# Patient Record
Sex: MALE | Race: WHITE | Employment: FULL TIME | URBAN - METROPOLITAN AREA
[De-identification: names, ages, dates, MRNs, and addresses within clinical notes are randomized per-mention and may not be internally consistent; named-entity substitution may affect disease eponyms.]

---

## 2017-05-23 RX ORDER — AMITRIPTYLINE HYDROCHLORIDE 10 MG/1
10 TABLET, FILM COATED ORAL
Qty: 30 TAB | Refills: 1 | Status: SHIPPED | OUTPATIENT
Start: 2017-05-23 | End: 2017-06-08 | Stop reason: SDUPTHER

## 2017-05-23 RX ORDER — BUPROPION HYDROCHLORIDE 300 MG/1
300 TABLET ORAL
Qty: 30 TAB | Refills: 1 | Status: SHIPPED | OUTPATIENT
Start: 2017-05-23 | End: 2017-06-29 | Stop reason: SDUPTHER

## 2017-05-23 NOTE — TELEPHONE ENCOUNTER
From: Antwan Pino  To:  Debbie Sepulveda MD  Sent: 5/23/2017 10:34 AM EDT  Subject: Medication Renewal Request    Original authorizing provider: MD Antwan Lee would like a refill of the following medications:  buPROPion XL (WELLBUTRIN XL) 300 mg XL tablet Debbie Sepulveda MD]  amitriptyline (ELAVIL) 10 mg tablet Debbie Sepulveda MD]    Preferred pharmacy: [facility]/PHARMACY #[id number] - [address], [address] [facility],Third Floor:

## 2017-05-26 RX ORDER — AMITRIPTYLINE HYDROCHLORIDE 10 MG/1
TABLET, FILM COATED ORAL
Qty: 30 TAB | Refills: 4 | Status: SHIPPED | OUTPATIENT
Start: 2017-05-26 | End: 2017-06-08 | Stop reason: SDUPTHER

## 2017-06-08 ENCOUNTER — OFFICE VISIT (OUTPATIENT)
Dept: INTERNAL MEDICINE CLINIC | Age: 37
End: 2017-06-08

## 2017-06-08 VITALS
OXYGEN SATURATION: 98 % | TEMPERATURE: 97.8 F | HEIGHT: 73 IN | RESPIRATION RATE: 18 BRPM | BODY MASS INDEX: 25.25 KG/M2 | SYSTOLIC BLOOD PRESSURE: 129 MMHG | HEART RATE: 65 BPM | WEIGHT: 190.5 LBS | DIASTOLIC BLOOD PRESSURE: 85 MMHG

## 2017-06-08 DIAGNOSIS — F41.9 ANXIETY: ICD-10-CM

## 2017-06-08 DIAGNOSIS — F43.0 STRESS REACTION: Primary | ICD-10-CM

## 2017-06-08 RX ORDER — CLONAZEPAM 0.5 MG/1
0.5 TABLET ORAL
Qty: 30 TAB | Refills: 1 | Status: SHIPPED | OUTPATIENT
Start: 2017-06-08 | End: 2018-12-04 | Stop reason: SDUPTHER

## 2017-06-08 NOTE — PROGRESS NOTES
HISTORY OF PRESENT ILLNESS  Remedios Flores is a 31 y.o. male. HPI  Follow up on anxiety, stress reaction. Increased anxiety - getting  in July, grandmother sick, work stressful. Using lorazepam more frequently up to 6 times/ week. Works well in certain situations. No depression. IBS-D symptoms much improved on Viberzi. Seeing DR. Asuncion Casas for this. Patient Active Problem List   Diagnosis Code    AR (allergic rhinitis) J30.9    Passage of loose stools R19.7    Anxiety F41.9    IBS (irritable bowel syndrome) K58.9     Past Medical History:   Diagnosis Date    Sinus infection     patient first      Allergies   Allergen Reactions    Arthrotec 50 [Diclofenac-Misoprostol] Other (comments)     GI upset     Diclofenac Diarrhea     GI upset.  Indomethacin Other (comments)     Gi symptoms     Current Outpatient Prescriptions on File Prior to Visit   Medication Sig Dispense Refill    buPROPion XL (WELLBUTRIN XL) 300 mg XL tablet Take 1 Tab by mouth every morning. Appointment required before additional refills approved. 30 Tab 1    VIBERZI 100 mg tablet Take 100 mg by mouth two (2) times daily (with meals).  amitriptyline (ELAVIL) 10 mg tablet TAKE 1 TABLET BY MOUTH AT BEDTIME 30 Tab 5    famotidine (PEPCID AC) 10 mg tablet Take 10 mg by mouth as needed.  fluticasone (FLONASE) 50 mcg/actuation nasal spray USE 1 SPRAY EACH NOSTRIL EVERY DAY (Patient taking differently: USE 1 SPRAY EACH NOSTRIL EVERY DAY AS NEEDED) 1 Bottle 11    loratadine (CLARITIN) 10 mg tablet Take 10 mg by mouth as needed.  ACETAMINOPHEN (TYLENOL PO) Take 1,000 mg by mouth as needed.  loperamide (IMMODIUM) 2 mg tablet Take 1 Tab by mouth four (4) times daily as needed for Diarrhea.  0     No current facility-administered medications on file prior to visit.       Social History   Substance Use Topics    Smoking status: Never Smoker    Smokeless tobacco: Never Used    Alcohol use 1.0 oz/week     1 Cans of beer, 1 Shots of liquor per week      Comment: very rare          ROS    Physical Exam   Psychiatric: He has a normal mood and affect. His speech is normal and behavior is normal. Judgment and thought content normal. Cognition and memory are normal.     Visit Vitals    /85 (BP 1 Location: Left arm, BP Patient Position: Sitting)    Pulse 65    Temp 97.8 °F (36.6 °C) (Oral)    Resp 18    Ht 6' 1\" (1.854 m)    Wt 190 lb 8 oz (86.4 kg)    SpO2 98%    BMI 25.13 kg/m2       ASSESSMENT and PLAN  Efrain Lopez was seen today for anxiety. Diagnoses and all orders for this visit:    Stress reaction  -     clonazePAM (KLONOPIN) 0.5 mg tablet; Take 1 Tab by mouth two (2) times daily as needed. Max Daily Amount: 1 mg. Anxiety  -     clonazePAM (KLONOPIN) 0.5 mg tablet; Take 1 Tab by mouth two (2) times daily as needed. Max Daily Amount: 1 mg. He's having to take more frequent doses of lorazepam.  Change to clonazepam for longer duration of treatment. Call if not helping. Continue wellbutrin. He feels stress will be relieved after his wedding. Over 50% of the 15 minutes face to face with Isamar Donald consisted of counseling and/or discussing treatment plans in reference to his anxiety. Follow-up Disposition:  Return in about 6 months (around 12/8/2017).

## 2017-06-08 NOTE — MR AVS SNAPSHOT
Visit Information Date & Time Provider Department Dept. Phone Encounter #  
 6/8/2017  1:15 PM Sergio Abreu MD [facility] [address] [id number] Follow-up Instructions Return in about 6 months (around 12/8/2017). Upcoming Health Maintenance Date Due INFLUENZA AGE 9 TO ADULT 8/1/2017 DTaP/Tdap/Td series (2 - Td) 2/27/2026 COLONOSCOPY 6/7/2026 Allergies as of 6/8/2017  Review Complete On: 12/13/2016 By: Sergio Abreu MD  
  
 Severity Noted Reaction Type Reactions Arthrotec 50 [Diclofenac-misoprostol]  04/02/2012   Side Effect Other (comments) GI upset Diclofenac  03/09/2012    Diarrhea GI upset. Indomethacin  09/23/2014    Other (comments) Gi symptoms Current Immunizations  Reviewed on 9/23/2014 Name Date Tdap 2/27/2016 Not reviewed this visit You Were Diagnosed With   
  
 Codes Comments Stress reaction    -  Primary ICD-10-CM: F43.0 ICD-9-CM: 308.9 Anxiety     ICD-10-CM: F41.9 ICD-9-CM: 300.00 Vitals BP Pulse Temp Resp Height(growth percentile) Weight(growth percentile) 129/85 (BP 1 Location: Left arm, BP Patient Position: Sitting) 65 97.8 °F (36.6 °C) (Oral) 18 6' 1\" (1.854 m) 190 lb 8 oz (86.4 kg) SpO2 BMI Smoking Status 98% 25.13 kg/m2 Never Smoker BMI and BSA Data Body Mass Index Body Surface Area  
 25.13 kg/m 2 2.11 m 2 Preferred Pharmacy Pharmacy Name Phone [id number] [address]  [phone] Your Updated Medication List  
  
   
This list is accurate as of: 6/8/17  1:36 PM.  Always use your most recent med list.  
  
  
  
  
 amitriptyline 10 mg tablet Commonly known as:  ELAVIL TAKE 1 TABLET BY MOUTH AT BEDTIME  
  
 buPROPion  mg XL tablet Commonly known as:  Glenora Arlette Take 1 Tab by mouth every morning. Appointment required before additional refills approved. CLARITIN 10 mg tablet Generic drug:  loratadine Take 10 mg by mouth as needed. clonazePAM 0.5 mg tablet Commonly known as:  [place] Alonsoam Take 1 Tab by mouth two (2) times daily as needed. Max Daily Amount: 1 mg. fluticasone 50 mcg/actuation nasal spray Commonly known as:  FLONASE  
USE 1 SPRAY EACH NOSTRIL EVERY DAY  
  
 loperamide 2 mg tablet Commonly known as:  IMMODIUM Take 1 Tab by mouth four (4) times daily as needed for Diarrhea. PEPCID AC 10 mg tablet Generic drug:  famotidine Take 10 mg by mouth as needed. TYLENOL PO Take 1,000 mg by mouth as needed. VIBERZI 100 mg tablet Generic drug:  eluxadoline Take 100 mg by mouth two (2) times daily (with meals). Prescriptions Printed Refills  
 clonazePAM (KLONOPIN) 0.5 mg tablet 1 Sig: Take 1 Tab by mouth two (2) times daily as needed. Max Daily Amount: 1 mg. Class: Print Route: Oral  
  
Follow-up Instructions Return in about 6 months (around 12/8/2017). Introducing [facility] & HEALTH SERVICES! Dear Madison Holder: Thank you for requesting a [employer]way account. Our records indicate that you already have an active Subway account. You can access your account anytime at [email or web address] "[email or web address]/[email or web address] Did you know that you can access your hospital and ER discharge instructions at any time in Subway? You can also review all of your test results from your hospital stay or ER visit. Additional Information If you have questions, please visit the Frequently Asked Questions section of the Subway website at [email or web address] "[email or web address][email or web address]/. Remember, Subway is NOT to be used for urgent needs. For medical emergencies, dial 911. Now available from your iPhone and Android! Please provide this summary of care documentation to your next provider. Your primary care clinician is listed as Beck Chang.  If you have any questions after today's visit, please call [phone].

## 2017-06-29 RX ORDER — BUPROPION HYDROCHLORIDE 300 MG/1
300 TABLET ORAL
Qty: 90 TAB | Refills: 0 | Status: SHIPPED | OUTPATIENT
Start: 2017-06-29 | End: 2017-06-29 | Stop reason: SDUPTHER

## 2017-06-29 NOTE — TELEPHONE ENCOUNTER
From: Perla Rojas  To: Ruthie Beavers MD  Sent: 6/29/2017 11:25 AM EDT  Subject: Medication Renewal Request    Original authorizing provider: MD Perla Reid would like a refill of the following medications:  buPROPion XL (WELLBUTRIN XL) 300 mg XL tablet Ruthie Beavers MD]    Preferred pharmacy: [facility]/PHARMACY #[id number] - [address]:  I attempted to have a 90-day supply and there are not enough refills per the pharmacy.

## 2017-11-01 ENCOUNTER — OFFICE VISIT (OUTPATIENT)
Dept: INTERNAL MEDICINE CLINIC | Age: 37
End: 2017-11-01

## 2017-11-01 VITALS
SYSTOLIC BLOOD PRESSURE: 118 MMHG | OXYGEN SATURATION: 98 % | HEIGHT: 73 IN | HEART RATE: 83 BPM | RESPIRATION RATE: 18 BRPM | DIASTOLIC BLOOD PRESSURE: 83 MMHG | TEMPERATURE: 98.8 F | WEIGHT: 195 LBS | BODY MASS INDEX: 25.84 KG/M2

## 2017-11-01 DIAGNOSIS — J01.40 ACUTE NON-RECURRENT PANSINUSITIS: Primary | ICD-10-CM

## 2017-11-01 RX ORDER — CEFDINIR 300 MG/1
300 CAPSULE ORAL 2 TIMES DAILY
Qty: 20 CAP | Refills: 0 | Status: SHIPPED | OUTPATIENT
Start: 2017-11-01 | End: 2018-12-04 | Stop reason: ALTCHOICE

## 2017-11-01 NOTE — PROGRESS NOTES
HISTORY OF PRESENT ILLNESS  Swathi Leone is a 30 y.o. male. HPI  Upper respiratory illness:  Swathi Leone presents with complaints of congestion, sore throat, post nasal drip, fever, chills, upper sinus pain, unknown color nasal discharge and fatigue for 3-4 weeks. no nausea and no vomiting . he has not had  night sweats, myalgias, fever and chills. Symptoms are moderate. Over-the-counter remedies including mucinex, flonase   has been used with poor relief of symptoms. Worse over last week. Drinking plenty of fluids: yes  Asthma?:  no  non-smoker  Contacts with similar infections: yes    Patient Active Problem List   Diagnosis Code    AR (allergic rhinitis) J30.9    Passage of loose stools R19.7    Anxiety F41.9    IBS (irritable bowel syndrome) K58.9     Past Medical History:   Diagnosis Date    Sinus infection     patient first      Allergies   Allergen Reactions    Arthrotec 50 [Diclofenac-Misoprostol] Other (comments)     GI upset     Diclofenac Diarrhea     GI upset.  Indomethacin Other (comments)     Gi symptoms     Current Outpatient Prescriptions on File Prior to Visit   Medication Sig Dispense Refill    buPROPion XL (WELLBUTRIN XL) 300 mg XL tablet TAKE 1 TAB BY MOUTH EVERY MORNING. 90 Tab 1    clonazePAM (KLONOPIN) 0.5 mg tablet Take 1 Tab by mouth two (2) times daily as needed. Max Daily Amount: 1 mg. 30 Tab 1    VIBERZI 100 mg tablet Take 100 mg by mouth two (2) times daily (with meals).  amitriptyline (ELAVIL) 10 mg tablet TAKE 1 TABLET BY MOUTH AT BEDTIME 30 Tab 5    famotidine (PEPCID AC) 10 mg tablet Take 10 mg by mouth as needed.  fluticasone (FLONASE) 50 mcg/actuation nasal spray USE 1 SPRAY EACH NOSTRIL EVERY DAY (Patient taking differently: USE 1 SPRAY EACH NOSTRIL EVERY DAY AS NEEDED) 1 Bottle 11    loratadine (CLARITIN) 10 mg tablet Take 10 mg by mouth as needed.  ACETAMINOPHEN (TYLENOL PO) Take 1,000 mg by mouth as needed.       loperamide (IMMODIUM) 2 mg tablet Take 1 Tab by mouth four (4) times daily as needed for Diarrhea.  0     No current facility-administered medications on file prior to visit. Social History   Substance Use Topics    Smoking status: Never Smoker    Smokeless tobacco: Never Used    Alcohol use 1.0 oz/week     1 Cans of beer, 1 Shots of liquor per week      Comment: very rare                  ROS    Physical Exam   Constitutional: He appears well-developed and well-nourished. No distress. /83 (BP 1 Location: Left arm, BP Patient Position: Sitting)  Pulse 83  Temp 98.8 °F (37.1 °C) (Oral)   Resp 18  Ht 6' 1\" (1.854 m)  Wt 195 lb (88.5 kg)  SpO2 98%  BMI 25.73 kg/m2   HENT:   Head: Normocephalic and atraumatic. Right Ear: Tympanic membrane and ear canal normal. No decreased hearing is noted. Left Ear: Tympanic membrane and ear canal normal. No decreased hearing is noted. Nose: No mucosal edema or rhinorrhea. No epistaxis. Right sinus exhibits no maxillary sinus tenderness and no frontal sinus tenderness. Left sinus exhibits no maxillary sinus tenderness and no frontal sinus tenderness. Mouth/Throat: Uvula is midline and mucous membranes are normal. No oral lesions. Normal dentition. Posterior oropharyngeal erythema present. No oropharyngeal exudate, posterior oropharyngeal edema or tonsillar abscesses. Eyes: Conjunctivae are normal. Pupils are equal, round, and reactive to light. Right eye exhibits no discharge. Left eye exhibits no discharge. No scleral icterus. Neck: Neck supple. Cardiovascular: Normal rate, regular rhythm and normal heart sounds. Pulmonary/Chest: Effort normal and breath sounds normal.   Lymphadenopathy:     He has no cervical adenopathy. Neurological: He is alert. Skin: Skin is warm and dry. He is not diaphoretic. Nursing note and vitals reviewed. ASSESSMENT and PLAN  Diagnoses and all orders for this visit:    1.  Acute non-recurrent pansinusitis  Julianne Cranker was diagnosed with sinusitis. he is advised to drink plenty of water, use shower steam or humidifier to loosen secretions, saline nasal lavage 3 times daily and get plenty of rest.  he may use mucinex 1200mg twice daily, afrin nasal spray (2 sprays each nostril twice daily for up to 5 days) along with tylenol or advil as needed for fever and pain. Written instructions were given to the patient emphasizing these recommendations. -     cefdinir (OMNICEF) 300 mg capsule; Take 1 Cap by mouth two (2) times a day. Follow-up Disposition:  Return if symptoms worsen or fail to improve.

## 2017-11-01 NOTE — PATIENT INSTRUCTIONS
Sinusitis: Care Instructions  Your Care Instructions    Sinusitis is an infection of the lining of the sinus cavities in your head. Sinusitis often follows a cold. It causes pain and pressure in your head and face. In most cases, sinusitis gets better on its own in 1 to 2 weeks. But some mild symptoms may last for several weeks. Sometimes antibiotics are needed. Follow-up care is a key part of your treatment and safety. Be sure to make and go to all appointments, and call your doctor if you are having problems. It's also a good idea to know your test results and keep a list of the medicines you take. How can you care for yourself at home? · Take an over-the-counter pain medicine, such as acetaminophen (Tylenol), ibuprofen (Advil, Motrin), or naproxen (Aleve). Read and follow all instructions on the label. · If the doctor prescribed antibiotics, take them as directed. Do not stop taking them just because you feel better. You need to take the full course of antibiotics. · Be careful when taking over-the-counter cold or flu medicines and Tylenol at the same time. Many of these medicines have acetaminophen, which is Tylenol. Read the labels to make sure that you are not taking more than the recommended dose. Too much acetaminophen (Tylenol) can be harmful. · Breathe warm, moist air from a steamy shower, a hot bath, or a sink filled with hot water. Avoid cold, dry air. Using a humidifier in your home may help. Follow the directions for cleaning the machine. · Use saline (saltwater) nasal washes to help keep your nasal passages open and wash out mucus and bacteria. You can buy saline nose drops at a grocery store or drugstore. Or you can make your own at home by adding 1 teaspoon of salt and 1 teaspoon of baking soda to 2 cups of distilled water. If you make your own, fill a bulb syringe with the solution, insert the tip into your nostril, and squeeze gently. Jose Garberderick your nose.   · Put a hot, wet towel or a warm gel pack on your face 3 or 4 times a day for 5 to 10 minutes each time. · Try a decongestant nasal spray like oxymetazoline (Afrin). Do not use it for more than 3 days in a row. Using it for more than 3 days can make your congestion worse. When should you call for help? Call your doctor now or seek immediate medical care if:  ? · You have new or worse swelling or redness in your face or around your eyes. ? · You have a new or higher fever. ? Watch closely for changes in your health, and be sure to contact your doctor if:  ? · You have new or worse facial pain. ? · The mucus from your nose becomes thicker (like pus) or has new blood in it. ? · You are not getting better as expected. Where can you learn more? Go to http://vane-lyndon.info/. Enter H344 in the search box to learn more about \"Sinusitis: Care Instructions. \"  Current as of: May 12, 2017  Content Version: 11.4  © 2461-0511 [employer]wise, [employer]. Care instructions adapted under license by [employer] (which disclaims liability or warranty for this information). If you have questions about a medical condition or this instruction, always ask your healthcare professional. [address] any warranty or liability for your use of this information.

## 2017-11-01 NOTE — MR AVS SNAPSHOT
Visit Information Date & Time Provider Department Dept. Phone Encounter #  
 11/1/2017 11:15 AM Sofya Adan MD [facility] [id number] Follow-up Instructions Return if symptoms worsen or fail to improve. Your Appointments 12/7/2017 10:15 AM  
COMPLETE PHYSICAL with Sofya Adan MD  
[facility] [address]) Appt Note: cpe; cpe  
 [address] [facility] [address] [address]  
[phone]  
  
   
 [address] [place] [address] [facility] [address] Upcoming Health Maintenance Date Due INFLUENZA AGE 9 TO ADULT 8/1/2017 DTaP/Tdap/Td series (2 - Td) 2/27/2026 COLONOSCOPY 6/7/2026 Allergies as of 11/1/2017  Review Complete On: 11/1/2017 By: Sofya Adan MD  
  
 Severity Noted Reaction Type Reactions Arthrotec 50 [Diclofenac-misoprostol]  04/02/2012   Side Effect Other (comments) GI upset Diclofenac  03/09/2012    Diarrhea GI upset. Indomethacin  09/23/2014    Other (comments) Gi symptoms Current Immunizations  Reviewed on 9/23/2014 Name Date Tdap 2/27/2016 Not reviewed this visit You Were Diagnosed With   
  
 Codes Comments Acute non-recurrent pansinusitis    -  Primary ICD-10-CM: J01.40 ICD-9-CM: 461.8 Vitals BP Pulse Temp Resp Height(growth percentile) Weight(growth percentile) 118/83 (BP 1 Location: Left arm, BP Patient Position: Sitting) 83 98.8 °F (37.1 °C) (Oral) 18 6' 1\" (1.854 m) 195 lb (88.5 kg) SpO2 BMI Smoking Status 98% 25.73 kg/m2 Never Smoker Vitals History BMI and BSA Data Body Mass Index Body Surface Area 25.73 kg/m 2 2.14 m 2 Preferred Pharmacy Pharmacy Name Phone [id number] [address] , [address] [place] [address]  [phone] Your Updated Medication List  
  
   
This list is accurate as of: 11/1/17 11:52 AM.  Always use your most recent med list.  
  
  
  
  
 amitriptyline 10 mg tablet Commonly known as:  ELAVIL TAKE 1 TABLET BY MOUTH AT BEDTIME  
  
 buPROPion  mg XL tablet Commonly known as:  WELLBUTRIN XL  
TAKE 1 TAB BY MOUTH EVERY MORNING. cefdinir 300 mg capsule Commonly known as:  OMNICEF Take 1 Cap by mouth two (2) times a day. CLARITIN 10 mg tablet Generic drug:  loratadine Take 10 mg by mouth as needed. clonazePAM 0.5 mg tablet Commonly known as:  Ranjeet Aver Take 1 Tab by mouth two (2) times daily as needed. Max Daily Amount: 1 mg. fluticasone 50 mcg/actuation nasal spray Commonly known as:  FLONASE  
USE 1 SPRAY EACH NOSTRIL EVERY DAY  
  
 loperamide 2 mg tablet Commonly known as:  IMMODIUM Take 1 Tab by mouth four (4) times daily as needed for Diarrhea. MUCINEX 1,200 mg Ta12 ER tablet Generic drug:  guaiFENesin Take 1,200 mg by mouth two (2) times a day. PEPCID AC 10 mg tablet Generic drug:  famotidine Take 10 mg by mouth as needed. TYLENOL PO Take 1,000 mg by mouth as needed. VIBERZI 100 mg tablet Generic drug:  eluxadoline Take 100 mg by mouth two (2) times daily (with meals). Prescriptions Sent to Pharmacy Refills  
 cefdinir (OMNICEF) 300 mg capsule 0 Sig: Take 1 Cap by mouth two (2) times a day. Class: Normal  
 Pharmacy: [facility]/pharmacy #[id number] Iqbal #: [phone] Route: Oral  
  
Follow-up Instructions Return if symptoms worsen or fail to improve. Patient Instructions Sinusitis: Care Instructions Your Care Instructions Sinusitis is an infection of the lining of the sinus cavities in your head. Sinusitis often follows a cold. It causes pain and pressure in your head and face. In most cases, sinusitis gets better on its own in 1 to 2 weeks. But some mild symptoms may last for several weeks. Sometimes antibiotics are needed. Follow-up care is a key part of your treatment and safety. Be sure to make and go to all appointments, and call your doctor if you are having problems. It's also a good idea to know your test results and keep a list of the medicines you take. How can you care for yourself at home? · Take an over-the-counter pain medicine, such as acetaminophen (Tylenol), ibuprofen (Advil, Motrin), or naproxen (Aleve). Read and follow all instructions on the label. · If the doctor prescribed antibiotics, take them as directed. Do not stop taking them just because you feel better. You need to take the full course of antibiotics. · Be careful when taking over-the-counter cold or flu medicines and Tylenol at the same time. Many of these medicines have acetaminophen, which is Tylenol. Read the labels to make sure that you are not taking more than the recommended dose. Too much acetaminophen (Tylenol) can be harmful. · Breathe warm, moist air from a steamy shower, a hot bath, or a sink filled with hot water. Avoid cold, dry air. Using a humidifier in your home may help. Follow the directions for cleaning the machine. · Use saline (saltwater) nasal washes to help keep your nasal passages open and wash out mucus and bacteria. You can buy saline nose drops at a grocery store or drugstore. Or you can make your own at home by adding 1 teaspoon of salt and 1 teaspoon of baking soda to 2 cups of distilled water. If you make your own, fill a bulb syringe with the solution, insert the tip into your nostril, and squeeze gently. [place] your nose. · Put a hot, wet towel or a warm gel pack on your face 3 or 4 times a day for 5 to 10 minutes each time. · Try a decongestant nasal spray like oxymetazoline (Afrin). Do not use it for more than 3 days in a row. Using it for more than 3 days can make your congestion worse. When should you call for help? Call your doctor now or seek immediate medical care if: ? · You have new or worse swelling or redness in your face or around your eyes. ? · You have a new or higher fever. ? Watch closely for changes in your health, and be sure to contact your doctor if: 
? · You have new or worse facial pain. ? · The mucus from your nose becomes thicker (like pus) or has new blood in it. ? · You are not getting better as expected. Where can you learn more? Go to http://vane-lyndon.info/. Enter B798 in the search box to learn more about \"Sinusitis: Care Instructions. \" Current as of: May 12, 2017 Content Version: 11.4 © 4328-1606 [employer]ivel. Care instructions adapted under license by [employer] (which disclaims liability or warranty for this information). If you have questions about a medical condition or this instruction, always ask your healthcare professional. [address] any warranty or liability for your use of this information. Introducing [facility] & HEALTH SERVICES! Dear Almyra Castleman: Thank you for requesting a CrossLoop account. Our records indicate that you already have an active CrossLoop account. You can access your account anytime at [email or web address]://[employer]. [employer] Did you know that you can access your hospital and ER discharge instructions at any time in CrossLoop? You can also review all of your test results from your hospital stay or ER visit. Additional Information If you have questions, please visit the Frequently Asked Questions section of the CrossLoop website at https://[employer]. [employer]/. Remember, CrossLoop is NOT to be used for urgent needs. For medical emergencies, dial 911. Now available from your iPhone and Android! Please provide this summary of care documentation to your next provider. Your primary care clinician is listed as Stacy Shelton. If you have any questions after today's visit, please call [phone].

## 2018-02-01 RX ORDER — AMITRIPTYLINE HYDROCHLORIDE 10 MG/1
TABLET, FILM COATED ORAL
Qty: 90 TAB | Refills: 2 | Status: SHIPPED | OUTPATIENT
Start: 2018-02-01 | End: 2018-11-19 | Stop reason: SDUPTHER

## 2018-02-01 NOTE — TELEPHONE ENCOUNTER
Refill request from [facility] on file for amitriptyline HCL 10 mg tab.

## 2018-02-06 ENCOUNTER — PATIENT MESSAGE (OUTPATIENT)
Dept: INTERNAL MEDICINE CLINIC | Age: 38
End: 2018-02-06

## 2018-04-04 RX ORDER — BUPROPION HYDROCHLORIDE 300 MG/1
TABLET ORAL
Qty: 90 TAB | Refills: 0 | COMMUNITY
Start: 2018-04-04 | End: 2019-06-20 | Stop reason: SDUPTHER

## 2018-04-06 RX ORDER — BUPROPION HYDROCHLORIDE 300 MG/1
TABLET ORAL
Qty: 90 TAB | Refills: 1 | Status: SHIPPED | OUTPATIENT
Start: 2018-04-06 | End: 2018-09-21 | Stop reason: SDUPTHER

## 2018-11-19 RX ORDER — AMITRIPTYLINE HYDROCHLORIDE 10 MG/1
TABLET, FILM COATED ORAL
Qty: 90 TAB | Refills: 2 | Status: SHIPPED | OUTPATIENT
Start: 2018-11-19 | End: 2019-06-20 | Stop reason: SDUPTHER

## 2018-12-04 ENCOUNTER — OFFICE VISIT (OUTPATIENT)
Dept: INTERNAL MEDICINE CLINIC | Age: 38
End: 2018-12-04

## 2018-12-04 VITALS
TEMPERATURE: 98.6 F | WEIGHT: 202.8 LBS | DIASTOLIC BLOOD PRESSURE: 87 MMHG | SYSTOLIC BLOOD PRESSURE: 118 MMHG | OXYGEN SATURATION: 96 % | BODY MASS INDEX: 26.88 KG/M2 | HEART RATE: 87 BPM | RESPIRATION RATE: 12 BRPM | HEIGHT: 73 IN

## 2018-12-04 DIAGNOSIS — K58.0 IRRITABLE BOWEL SYNDROME WITH DIARRHEA: ICD-10-CM

## 2018-12-04 DIAGNOSIS — J01.11 ACUTE RECURRENT FRONTAL SINUSITIS: Primary | ICD-10-CM

## 2018-12-04 DIAGNOSIS — J45.21 MILD INTERMITTENT REACTIVE AIRWAY DISEASE WITH ACUTE EXACERBATION: ICD-10-CM

## 2018-12-04 DIAGNOSIS — F43.0 STRESS REACTION: ICD-10-CM

## 2018-12-04 DIAGNOSIS — F41.9 ANXIETY: ICD-10-CM

## 2018-12-04 RX ORDER — ALBUTEROL SULFATE 90 UG/1
2 AEROSOL, METERED RESPIRATORY (INHALATION)
Qty: 1 INHALER | Refills: 0 | Status: SHIPPED | OUTPATIENT
Start: 2018-12-04 | End: 2019-06-20 | Stop reason: ALTCHOICE

## 2018-12-04 RX ORDER — CLONAZEPAM 0.5 MG/1
0.5 TABLET ORAL
Qty: 30 TAB | Refills: 1 | Status: SHIPPED | OUTPATIENT
Start: 2018-12-04 | End: 2019-11-29 | Stop reason: SDUPTHER

## 2018-12-04 RX ORDER — BUPROPION HYDROCHLORIDE 300 MG/1
TABLET ORAL
Qty: 90 TAB | Refills: 1 | Status: SHIPPED | OUTPATIENT
Start: 2018-12-04 | End: 2019-06-20 | Stop reason: SDUPTHER

## 2018-12-04 RX ORDER — CEFDINIR 300 MG/1
300 CAPSULE ORAL 2 TIMES DAILY
Qty: 20 CAP | Refills: 0 | Status: SHIPPED | OUTPATIENT
Start: 2018-12-04 | End: 2019-01-03 | Stop reason: ALTCHOICE

## 2018-12-04 NOTE — PROGRESS NOTES
HISTORY OF PRESENT ILLNESS  Geoffrey Robles is a 41 y.o. male. HPI  Presents with complaints of sinus congestion and pressure for the past 3-4 weeks that has worsened over the past several days. Has been traveling for his job and exposed to varying temperatures which he feels has exacerbated symptoms. Has history of recurrent sinusitis that has been much better since he had sinus surgery several years ago. Having pain and pressure over bilateral eyes and thick postnasal drainage. Has been feeling chilled for the past 2 days. Also complains of harsh cough and some upper chest tightness for the past several days as well; denies history of asthma but reports having some upper chest tightness after episodes of prolonged laughter. Has been taking Mucinex OTC and also using Flonase nasal spray without significant improvement. He will be traveling for his work in the next week and is concerned about worsening symptoms while out of town. Feels like his anxiety has been fairly stable on current dose of Wellbutrin and Elavil. Takes clonazepam on a as needed basis especially when he is traveling. Denies harmful thoughts toward self or others. Has been very busy with his work schedule and travels often for his job. Needs refill of Wellbutrin  mg daily. Last refill of Clonazepam in 9/2017 as per  website. Has been working with Dr. Danilo Smith on control of his IBS and trying to identify specific triggers. Notes that when he is traveling for work he tends to develop a flare afterwards. Currently taking Viberzi which has given some relief. Denies blood in stools, weight loss.     Patient Active Problem List   Diagnosis Code    AR (allergic rhinitis) J30.9    Passage of loose stools R19.5    Anxiety F41.9    IBS (irritable bowel syndrome) K58.9     Past Surgical History:   Procedure Laterality Date    SINUS SURGERY PROC UNLISTED  12/2010     Social History     Socioeconomic History    Marital status: SINGLE     Spouse name: Not on file    Number of children: Not on file    Years of education: Not on file    Highest education level: Not on file   Social Needs    Financial resource strain: Not on file    Food insecurity - worry: Not on file    Food insecurity - inability: Not on file    Transportation needs - medical: Not on file   Not iT needs - non-medical: Not on file   Occupational History    Not on file   Tobacco Use    Smoking status: Never Smoker    Smokeless tobacco: Never Used   Substance and Sexual Activity    Alcohol use: Yes     Alcohol/week: 1.0 oz     Types: 1 Cans of beer, 1 Shots of liquor per week     Comment: very rare     Drug use: No    Sexual activity: Not on file   Other Topics Concern    Not on file   Social History Narrative    Not on file     History reviewed. No pertinent family history. Current Outpatient Medications   Medication Sig    buPROPion XL (WELLBUTRIN XL) 300 mg XL tablet TAKE 1 TABLET EVERY MORNING    cefdinir (OMNICEF) 300 mg capsule Take 1 Cap by mouth two (2) times a day.  clonazePAM (KLONOPIN) 0.5 mg tablet Take 1 Tab by mouth two (2) times daily as needed. Max Daily Amount: 1 mg.  albuterol (PROVENTIL HFA, VENTOLIN HFA, PROAIR HFA) 90 mcg/actuation inhaler Take 2 Puffs by inhalation every four (4) hours as needed for Wheezing.  amitriptyline (ELAVIL) 10 mg tablet TAKE 1 TABLET BY MOUTH AT BEDTIME    buPROPion XL (WELLBUTRIN XL) 300 mg XL tablet TAKE 1 TAB BY MOUTH EVERY MORNING.  guaiFENesin (MUCINEX) 1,200 mg Ta12 ER tablet Take 1,200 mg by mouth two (2) times a day.  VIBERZI 100 mg tablet Take 100 mg by mouth two (2) times daily (with meals).  famotidine (PEPCID AC) 10 mg tablet Take 10 mg by mouth as needed.     fluticasone (FLONASE) 50 mcg/actuation nasal spray USE 1 SPRAY EACH NOSTRIL EVERY DAY (Patient taking differently: USE 1 SPRAY EACH NOSTRIL EVERY DAY AS NEEDED)    loratadine (CLARITIN) 10 mg tablet Take 10 mg by mouth as needed.  ACETAMINOPHEN (TYLENOL PO) Take 1,000 mg by mouth as needed.  loperamide (IMMODIUM) 2 mg tablet Take 1 Tab by mouth four (4) times daily as needed for Diarrhea. No current facility-administered medications for this visit. Allergies   Allergen Reactions    Arthrotec 50 [Diclofenac-Misoprostol] Other (comments)     GI upset     Diclofenac Diarrhea     GI upset.  Indomethacin Other (comments)     Gi symptoms     Immunization History   Administered Date(s) Administered    Tdap 02/27/2016       Review of Systems   Constitutional: Positive for chills and malaise/fatigue. Negative for fever. HENT: Positive for congestion, sinus pain and sore throat. Negative for ear pain. Respiratory: Positive for cough and shortness of breath. Negative for sputum production. Cardiovascular: Negative for chest pain and palpitations. Gastrointestinal: Positive for abdominal pain and diarrhea. Negative for blood in stool, constipation, nausea and vomiting. Genitourinary: Negative for dysuria and frequency. Musculoskeletal: Negative for myalgias. Skin: Negative for rash. Neurological: Positive for headaches. Negative for dizziness and tingling. Psychiatric/Behavioral: The patient is nervous/anxious. /87 (BP 1 Location: Left arm, BP Patient Position: Sitting)   Pulse 87   Temp 98.6 °F (37 °C) (Oral)   Resp 12   Ht 6' 1\" (1.854 m)   Wt 202 lb 12.8 oz (92 kg)   SpO2 96%   BMI 26.76 kg/m²   Physical Exam   Constitutional: He is oriented to person, place, and time. He appears well-developed and well-nourished. HENT:   Head: Normocephalic and atraumatic. Right Ear: External ear normal.   Left Ear: External ear normal.   Nose: Mucosal edema present. Right sinus exhibits frontal sinus tenderness. Left sinus exhibits frontal sinus tenderness. Mouth/Throat: Posterior oropharyngeal erythema present. No posterior oropharyngeal edema.    Neck: Normal range of motion. Neck supple. No thyromegaly present. Cardiovascular: Normal rate, regular rhythm and normal heart sounds. Pulmonary/Chest: Effort normal. He has wheezes. He has no rales. Scattered end-inspiration wheezing   Abdominal: Soft. Bowel sounds are normal. There is no tenderness. Musculoskeletal: Normal range of motion. Lymphadenopathy:     He has no cervical adenopathy. Neurological: He is alert and oriented to person, place, and time. Psychiatric: He has a normal mood and affect. His behavior is normal.   Nursing note and vitals reviewed. ASSESSMENT and PLAN  Diagnoses and all orders for this visit:    1. Acute recurrent frontal sinusitis  -     cefdinir (OMNICEF) 300 mg capsule; Take 1 Cap by mouth two (2) times a day. 2. Mild intermittent reactive airway disease with acute exacerbation -- can use as needed  -     albuterol (PROVENTIL HFA, VENTOLIN HFA, PROAIR HFA) 90 mcg/actuation inhaler; Take 2 Puffs by inhalation every four (4) hours as needed for Wheezing. 3. Stress reaction  -     buPROPion XL (WELLBUTRIN XL) 300 mg XL tablet; TAKE 1 TABLET EVERY MORNING  -     clonazePAM (KLONOPIN) 0.5 mg tablet; Take 1 Tab by mouth two (2) times daily as needed. Max Daily Amount: 1 mg.  profile was accessed online for Drea Colmenares and reviewed by me during this encounter. I did not see evidence of inappropriate or suspicious controlled substance prescription activity. 4. Anxiety -- has been fairly stable; takes Clonazepam on prn basis when traveling.  -     buPROPion XL (WELLBUTRIN XL) 300 mg XL tablet; TAKE 1 TABLET EVERY MORNING  -     clonazePAM (KLONOPIN) 0.5 mg tablet; Take 1 Tab by mouth two (2) times daily as needed. Max Daily Amount: 1 mg.    5. Irritable bowel syndrome with diarrhea -- following with GI specialist.      Follow up if signs and symptoms worsen or change. After hours number given.    lab results and schedule of future lab studies reviewed with patient  reviewed diet, exercise and weight control  reviewed medications and side effects in detail

## 2018-12-04 NOTE — PATIENT INSTRUCTIONS
Sinusitis: Care Instructions  Your Care Instructions    Sinusitis is an infection of the lining of the sinus cavities in your head. Sinusitis often follows a cold. It causes pain and pressure in your head and face. In most cases, sinusitis gets better on its own in 1 to 2 weeks. But some mild symptoms may last for several weeks. Sometimes antibiotics are needed. Follow-up care is a key part of your treatment and safety. Be sure to make and go to all appointments, and call your doctor if you are having problems. It's also a good idea to know your test results and keep a list of the medicines you take. How can you care for yourself at home? · Take an over-the-counter pain medicine, such as acetaminophen (Tylenol), ibuprofen (Advil, Motrin), or naproxen (Aleve). Read and follow all instructions on the label. · If the doctor prescribed antibiotics, take them as directed. Do not stop taking them just because you feel better. You need to take the full course of antibiotics. · Be careful when taking over-the-counter cold or flu medicines and Tylenol at the same time. Many of these medicines have acetaminophen, which is Tylenol. Read the labels to make sure that you are not taking more than the recommended dose. Too much acetaminophen (Tylenol) can be harmful. · Breathe warm, moist air from a steamy shower, a hot bath, or a sink filled with hot water. Avoid cold, dry air. Using a humidifier in your home may help. Follow the directions for cleaning the machine. · Use saline (saltwater) nasal washes to help keep your nasal passages open and wash out mucus and bacteria. You can buy saline nose drops at a grocery store or drugstore. Or you can make your own at home by adding 1 teaspoon of salt and 1 teaspoon of baking soda to 2 cups of distilled water. If you make your own, fill a bulb syringe with the solution, insert the tip into your nostril, and squeeze gently. Alexandre Brakeman your nose.   · Put a hot, wet towel or a warm gel pack on your face 3 or 4 times a day for 5 to 10 minutes each time. · Try a decongestant nasal spray like oxymetazoline (Afrin). Do not use it for more than 3 days in a row. Using it for more than 3 days can make your congestion worse. When should you call for help? Call your doctor now or seek immediate medical care if:    · You have new or worse swelling or redness in your face or around your eyes.     · You have a new or higher fever.    Watch closely for changes in your health, and be sure to contact your doctor if:    · You have new or worse facial pain.     · The mucus from your nose becomes thicker (like pus) or has new blood in it.     · You are not getting better as expected. Where can you learn more? Go to http://vane-lyndon.info/. Enter T198 in the search box to learn more about \"Sinusitis: Care Instructions. \"  Current as of: March 28, 2018  Content Version: 11.8  © 0616-7892 [employer]. Care instructions adapted under license by [email or web address] (which disclaims liability or warranty for this information). If you have questions about a medical condition or this instruction, always ask your healthcare professional. [place] [address] any warranty or liability for your use of this information. Reactive Airway Disease: Care Instructions  Your Care Instructions    Reactive airway disease is a breathing problem that appears as wheezing, a whistling noise in your airways. It may be caused by a viral or bacterial infection, allergies, tobacco smoke, or something else in the environment. When you are around these triggers, your body releases chemicals that make the airways get tight. Reactive airway disease is a lot like asthma. Both can cause wheezing. But asthma is ongoing, while reactive airway disease may occur only now and then. Tests can be done to tell whether you have asthma. You may take the same medicines used to treat asthma. Good home care and follow-up care with your doctor can help you recover. Follow-up care is a key part of your treatment and safety. Be sure to make and go to all appointments, and call your doctor if you are having problems. It's also a good idea to know your test results and keep a list of the medicines you take. How can you care for yourself at home? · Take your medicines exactly as prescribed. Call your doctor if you think you are having a problem with your medicine. · Do not smoke or allow others to smoke around you. If you need help quitting, talk to your doctor about stop-smoking programs and medicines. These can increase your chances of quitting for good. · If you know what caused your wheezing (such as perfume or the odor of household chemicals), try to avoid it in the future. · Wash your hands several times a day, and consider using hand gels or wipes that contain alcohol. This can prevent colds and other infections. When should you call for help? Call 911 anytime you think you may need emergency care. For example, call if:    · You have severe trouble breathing.    Watch closely for changes in your health, and be sure to contact your doctor if:    · You cough up yellow, dark brown, or bloody mucus.     · You have a fever.     · Your wheezing gets worse. Where can you learn more? Go to http://vane-lyndon.info/. Enter K266 in the search box to learn more about \"Reactive Airway Disease: Care Instructions. \"  Current as of: December 6, 2017  Content Version: 11.8  © 5862-6963 [employer]. Care instructions adapted under license by [employer] (which disclaims liability or warranty for this information). If you have questions about a medical condition or this instruction, always ask your healthcare professional. [address] any warranty or liability for your use of this information.

## 2019-01-03 ENCOUNTER — TELEPHONE (OUTPATIENT)
Dept: INTERNAL MEDICINE CLINIC | Age: 39
End: 2019-01-03

## 2019-01-03 ENCOUNTER — OFFICE VISIT (OUTPATIENT)
Dept: INTERNAL MEDICINE CLINIC | Age: 39
End: 2019-01-03

## 2019-01-03 VITALS
DIASTOLIC BLOOD PRESSURE: 83 MMHG | HEIGHT: 73 IN | WEIGHT: 201 LBS | OXYGEN SATURATION: 98 % | TEMPERATURE: 98.5 F | SYSTOLIC BLOOD PRESSURE: 128 MMHG | BODY MASS INDEX: 26.64 KG/M2 | RESPIRATION RATE: 12 BRPM | HEART RATE: 83 BPM

## 2019-01-03 DIAGNOSIS — H69.83 EUSTACHIAN TUBE DYSFUNCTION, BILATERAL: ICD-10-CM

## 2019-01-03 DIAGNOSIS — J01.40 ACUTE PANSINUSITIS, RECURRENCE NOT SPECIFIED: Primary | ICD-10-CM

## 2019-01-03 RX ORDER — BENZONATATE 100 MG/1
100 CAPSULE ORAL
COMMUNITY
End: 2019-11-29

## 2019-01-03 RX ORDER — PREDNISONE 20 MG/1
20 TABLET ORAL
Qty: 10 TAB | Refills: 0 | Status: SHIPPED | OUTPATIENT
Start: 2019-01-03 | End: 2019-06-20 | Stop reason: ALTCHOICE

## 2019-01-03 RX ORDER — CEFDINIR 300 MG/1
300 CAPSULE ORAL 2 TIMES DAILY
Qty: 14 CAP | Refills: 0 | Status: SHIPPED | OUTPATIENT
Start: 2019-01-03 | End: 2019-06-20 | Stop reason: ALTCHOICE

## 2019-01-03 RX ORDER — PSEUDOEPHEDRINE HCL 30 MG
30 TABLET ORAL
Qty: 30 TAB | Refills: 0
Start: 2019-01-03 | End: 2019-06-20 | Stop reason: ALTCHOICE

## 2019-01-03 NOTE — PROGRESS NOTES
HISTORY OF PRESENT ILLNESS  Jemima Wood is a 41 y.o. male. HPI  Upper respiratory illness:  Jemima Wood presents with complaints of congestion, sore throat, post nasal drip, cough described as harsh, headache, generalized sinus pain, bilateral ear pressure and yellow nasal discharge for 2 weeks. He went to [facility] 1 week ago and was diagnosed with sinusitis and treated with 7 day course of Augmentin but he does not feel like symptoms have resolved. Continues with some sinus pressure and ears feel full. Has had some GI upset with Augmentin. no nausea and no vomiting . he has not had  myalgias, fever and chills. Symptoms are moderate. Over-the-counter remedies including Mucinex and Flonase   has been used with poor relief of symptoms. Drinking plenty of fluids: yes  Asthma?:  no  non-smoker  Contacts with similar infections: yes         Review of Systems   Constitutional: Positive for malaise/fatigue. Negative for chills and fever. HENT: Positive for congestion, ear pain, sinus pain and sore throat. Respiratory: Negative for cough. Cardiovascular: Negative for chest pain and palpitations. Gastrointestinal: Positive for diarrhea. Negative for nausea and vomiting. Genitourinary: Negative for dysuria and frequency. Musculoskeletal: Negative for back pain and neck pain. Skin: Negative for rash. Neurological: Positive for headaches. Negative for dizziness. /83 (BP 1 Location: Left arm, BP Patient Position: Sitting)   Pulse 83   Temp 98.5 °F (36.9 °C) (Oral)   Resp 12   Ht 6' 1\" (1.854 m)   Wt 201 lb (91.2 kg)   SpO2 98%   BMI 26.52 kg/m²   Physical Exam   Constitutional: He is oriented to person, place, and time. He appears well-developed and well-nourished. HENT:   Head: Normocephalic and atraumatic. Right Ear: External ear normal. Tympanic membrane is not injected. A middle ear effusion is present.    Left Ear: External ear normal. Tympanic membrane is not injected. A middle ear effusion is present. Nose: Mucosal edema present. Right sinus exhibits maxillary sinus tenderness. Left sinus exhibits maxillary sinus tenderness. Mouth/Throat: Posterior oropharyngeal erythema present. No posterior oropharyngeal edema. Neck: Normal range of motion. Neck supple. No thyromegaly present. Cardiovascular: Normal rate and regular rhythm. Pulmonary/Chest: Effort normal and breath sounds normal. He has no wheezes. Musculoskeletal: Normal range of motion. Lymphadenopathy:     He has no cervical adenopathy. Neurological: He is alert and oriented to person, place, and time. Skin: Skin is warm and dry. Psychiatric: He has a normal mood and affect. His behavior is normal.   Nursing note and vitals reviewed. ASSESSMENT and PLAN  Diagnoses and all orders for this visit:    1. Acute pansinusitis, recurrence not specified  -     cefdinir (OMNICEF) 300 mg capsule; Take 1 Cap by mouth two (2) times a day. -     predniSONE (DELTASONE) 20 mg tablet; Take 20 mg by mouth daily (with breakfast). -     pseudoephedrine (SUDAFED) 30 mg tablet; Take 1 Tab by mouth every four (4) hours as needed for Congestion. 2. Eustachian tube dysfunction, bilateral  -     predniSONE (DELTASONE) 20 mg tablet; Take 20 mg by mouth daily (with breakfast). -     pseudoephedrine (SUDAFED) 30 mg tablet; Take 1 Tab by mouth every four (4) hours as needed for Congestion.       lab results and schedule of future lab studies reviewed with patient  reviewed diet, exercise and weight control  reviewed medications and side effects in detail

## 2019-01-03 NOTE — PATIENT INSTRUCTIONS
Sinusitis: Care Instructions  Your Care Instructions    Sinusitis is an infection of the lining of the sinus cavities in your head. Sinusitis often follows a cold. It causes pain and pressure in your head and face. In most cases, sinusitis gets better on its own in 1 to 2 weeks. But some mild symptoms may last for several weeks. Sometimes antibiotics are needed. Follow-up care is a key part of your treatment and safety. Be sure to make and go to all appointments, and call your doctor if you are having problems. It's also a good idea to know your test results and keep a list of the medicines you take. How can you care for yourself at home? · Take an over-the-counter pain medicine, such as acetaminophen (Tylenol), ibuprofen (Advil, Motrin), or naproxen (Aleve). Read and follow all instructions on the label. · If the doctor prescribed antibiotics, take them as directed. Do not stop taking them just because you feel better. You need to take the full course of antibiotics. · Be careful when taking over-the-counter cold or flu medicines and Tylenol at the same time. Many of these medicines have acetaminophen, which is Tylenol. Read the labels to make sure that you are not taking more than the recommended dose. Too much acetaminophen (Tylenol) can be harmful. · Breathe warm, moist air from a steamy shower, a hot bath, or a sink filled with hot water. Avoid cold, dry air. Using a humidifier in your home may help. Follow the directions for cleaning the machine. · Use saline (saltwater) nasal washes to help keep your nasal passages open and wash out mucus and bacteria. You can buy saline nose drops at a grocery store or drugstore. Or you can make your own at home by adding 1 teaspoon of salt and 1 teaspoon of baking soda to 2 cups of distilled water. If you make your own, fill a bulb syringe with the solution, insert the tip into your nostril, and squeeze gently. Franceen Raysa your nose.   · Put a hot, wet towel or a warm gel pack on your face 3 or 4 times a day for 5 to 10 minutes each time. · Try a decongestant nasal spray like oxymetazoline (Afrin). Do not use it for more than 3 days in a row. Using it for more than 3 days can make your congestion worse. When should you call for help? Call your doctor now or seek immediate medical care if:    · You have new or worse swelling or redness in your face or around your eyes.     · You have a new or higher fever.    Watch closely for changes in your health, and be sure to contact your doctor if:    · You have new or worse facial pain.     · The mucus from your nose becomes thicker (like pus) or has new blood in it.     · You are not getting better as expected. Where can you learn more? Go to http://vane-lyndon.info/. Enter K446 in the search box to learn more about \"Sinusitis: Care Instructions. \"  Current as of: March 28, 2018  Content Version: 11.8  © 0376-4072 [employer]. Care instructions adapted under license by [employer] (which disclaims liability or warranty for this information). If you have questions about a medical condition or this instruction, always ask your healthcare professional. [place] [address] any warranty or liability for your use of this information. Eustachian Tube Problems: Care Instructions  Your Care Instructions    The eustachian (say \"you-STAY-shee-un\") tubes run between the inside of the ears and the throat. They keep air pressure stable in the ears. If your eustachian tubes become blocked, the air pressure in your ears changes. The fluids from a cold can clog eustachian tubes, causing pain in the ears. A quick change in air pressure can cause eustachian tubes to close up. This might happen when an airplane changes altitude or when a  goes up or down underwater. Eustachian tube problems often clear up on their own or after antibiotic treatment.  If your tubes continue to be blocked, you may need surgery. Follow-up care is a key part of your treatment and safety. Be sure to make and go to all appointments, and call your doctor if you are having problems. It's also a good idea to know your test results and keep a list of the medicines you take. How can you care for yourself at home? · To ease ear pain, apply a warm washcloth or a heating pad set on low. There may be some drainage from the ear when the heat melts earwax. Put a cloth between the heat source and your skin. Do not use a heating pad with children. · If your doctor prescribed antibiotics, take them as directed. Do not stop taking them just because you feel better. You need to take the full course of antibiotics. · Your doctor may recommend over-the-counter medicine. Be safe with medicines. Oral or nasal decongestants may relieve ear pain. Avoid decongestants that are combined with antihistamines, which tend to cause more blockage. But if allergies seem to be the problem, your doctor may recommend a combination. Be careful with cough and cold medicines. Don't give them to children younger than 6, because they don't work for children that age and can even be harmful. For children 6 and older, always follow all the instructions carefully. Make sure you know how much medicine to give and how long to use it. And use the dosing device if one is included. When should you call for help? Call your doctor now or seek immediate medical care if:    · You develop sudden, complete hearing loss.     · You have severe pain or feel dizzy.     · You have new or increasing pus or blood draining from your ear.     · You have redness, swelling, or pain around or behind the ear.    Watch closely for changes in your health, and be sure to contact your doctor if:    · You do not get better after 2 weeks.     · You have any new symptoms, such as itching or a feeling of fullness in the ear. Where can you learn more?   Go to http://vane-lyndon.info/. Enter Y822 in the search box to learn more about \"Eustachian Tube Problems: Care Instructions. \"  Current as of: March 28, 2018  Content Version: 11.8  © 1879-5996 [employer]wise, Incorporated. Care instructions adapted under license by [employer] (which disclaims liability or warranty for this information). If you have questions about a medical condition or this instruction, always ask your healthcare professional. [address] any warranty or liability for your use of this information.

## 2019-01-03 NOTE — TELEPHONE ENCOUNTER
----- Message from Dread Pelletier sent at 1/3/2019  1:11 PM EST -----  Regarding: Dr. Hester Chin: [phone]  Pt advises that he was seen at a [address] [facility] last week for sore throat, sinus issues, and fluid in his ears. He was given antibiotics and the issue has not cleared up. Pt would like to come in today or tomorrow if possible.

## 2019-06-18 ENCOUNTER — TELEPHONE (OUTPATIENT)
Dept: INTERNAL MEDICINE CLINIC | Age: 39
End: 2019-06-18

## 2019-06-18 NOTE — TELEPHONE ENCOUNTER
I spoke with patient regarding his acute appt on Thursday. Pt states he never got the letter in the mail regarding his PCP Dr. Álvaro Ovalle. I explained what the letter stated and that he will have to est care with another [facility]/new pcp practice. We will see pt for his acute sx on Thursday and provide pt with a list of other practices to est care. Pt was very thankful for the call.

## 2019-06-20 ENCOUNTER — OFFICE VISIT (OUTPATIENT)
Dept: INTERNAL MEDICINE CLINIC | Age: 39
End: 2019-06-20

## 2019-06-20 VITALS
HEART RATE: 64 BPM | SYSTOLIC BLOOD PRESSURE: 123 MMHG | RESPIRATION RATE: 18 BRPM | TEMPERATURE: 99.5 F | OXYGEN SATURATION: 96 % | DIASTOLIC BLOOD PRESSURE: 83 MMHG | WEIGHT: 196 LBS | BODY MASS INDEX: 25.98 KG/M2 | HEIGHT: 73 IN

## 2019-06-20 DIAGNOSIS — J06.9 UPPER RESPIRATORY TRACT INFECTION, UNSPECIFIED TYPE: Primary | ICD-10-CM

## 2019-06-20 DIAGNOSIS — F41.9 ANXIETY: ICD-10-CM

## 2019-06-20 DIAGNOSIS — F43.0 STRESS REACTION: ICD-10-CM

## 2019-06-20 RX ORDER — AMITRIPTYLINE HYDROCHLORIDE 10 MG/1
10 TABLET, FILM COATED ORAL
Qty: 30 TAB | Refills: 5 | Status: SHIPPED | OUTPATIENT
Start: 2019-06-20 | End: 2019-11-29 | Stop reason: SDUPTHER

## 2019-06-20 RX ORDER — CEFDINIR 300 MG/1
300 CAPSULE ORAL 2 TIMES DAILY
Qty: 14 CAP | Refills: 0 | Status: SHIPPED | OUTPATIENT
Start: 2019-06-20 | End: 2019-11-29

## 2019-06-20 RX ORDER — PSEUDOEPHEDRINE HCL 30 MG
30 TABLET ORAL
Qty: 20 TAB | Refills: 0
Start: 2019-06-20 | End: 2019-11-29

## 2019-06-20 RX ORDER — BUPROPION HYDROCHLORIDE 300 MG/1
TABLET ORAL
Qty: 90 TAB | Refills: 1 | Status: SHIPPED | OUTPATIENT
Start: 2019-06-20 | End: 2019-11-29 | Stop reason: SDUPTHER

## 2019-06-20 NOTE — PATIENT INSTRUCTIONS

## 2019-06-20 NOTE — PROGRESS NOTES
HISTORY OF PRESENT ILLNESS  Jolie Peoples is a 41 y.o. male. HPI  Upper respiratory illness:  Jolie Peoples presents with complaints of congestion, sore throat, post nasal drip, headache, suspected fevers but not measured at home and clear to green nasal discharge for 3 weeks. Initially felt like he had a cold but then symptoms persisted and have worsened since last week when he started feeling feverish and more fatigued. no nausea and no vomiting . he has not had  myalgias. Symptoms are moderate. Over-the-counter remedies including Mucinex, Flonase nasal spray   has been used with poor relief of symptoms. Drinking plenty of fluids: yes  Asthma?:  no  non-smoker  Contacts with similar infections: yes     Reports anxiety has been stable on current dose of Wellbutrin and Elavil. Has been very busy with work but overall feels like he is handling stressors better. Denies harmful thoughts towards self or others. Patient Active Problem List   Diagnosis Code    AR (allergic rhinitis) J30.9    Passage of loose stools R19.5    Anxiety F41.9    IBS (irritable bowel syndrome) K58.9     Past Surgical History:   Procedure Laterality Date    SINUS SURGERY PROC UNLISTED  12/2010     Social History     Socioeconomic History    Marital status: SINGLE     Spouse name: Not on file    Number of children: Not on file    Years of education: Not on file    Highest education level: Not on file   Occupational History    Not on file   Social Needs    Financial resource strain: Not on file    Food insecurity:     Worry: Not on file     Inability: Not on file    Transportation needs:     Medical: Not on file     Non-medical: Not on file   Tobacco Use    Smoking status: Never Smoker    Smokeless tobacco: Never Used   Substance and Sexual Activity    Alcohol use:  Yes     Alcohol/week: 1.0 oz     Types: 1 Cans of beer, 1 Shots of liquor per week     Comment: very rare     Drug use: No    Sexual activity: Not on file   Lifestyle    Physical activity:     Days per week: Not on file     Minutes per session: Not on file    Stress: Not on file   Relationships    Social connections:     Talks on phone: Not on file     Gets together: Not on file     Attends [employer] service: Not on file     Active member of club or organization: Not on file     Attends meetings of clubs or organizations: Not on file     Relationship status: Not on file    Intimate partner violence:     Fear of current or ex partner: Not on file     Emotionally abused: Not on file     Physically abused: Not on file     Forced sexual activity: Not on file   Other Topics Concern    Not on file   Social History Narrative    Not on file     No family history on file. Current Outpatient Medications   Medication Sig    amitriptyline (ELAVIL) 10 mg tablet Take 1 Tab by mouth nightly.  buPROPion XL (WELLBUTRIN XL) 300 mg XL tablet TAKE 1 TABLET EVERY MORNING    cefdinir (OMNICEF) 300 mg capsule Take 1 Cap by mouth two (2) times a day.  pseudoephedrine (SUDAFED) 30 mg tablet Take 1 Tab by mouth every four (4) hours as needed for Congestion.  guaiFENesin (MUCINEX) 1,200 mg Ta12 ER tablet Take 1,200 mg by mouth two (2) times a day.  VIBERZI 100 mg tablet Take 100 mg by mouth two (2) times daily (with meals).  famotidine (PEPCID AC) 10 mg tablet Take 10 mg by mouth as needed.  fluticasone (FLONASE) 50 mcg/actuation nasal spray USE 1 SPRAY EACH NOSTRIL EVERY DAY (Patient taking differently: USE 1 SPRAY EACH NOSTRIL EVERY DAY AS NEEDED)    loratadine (CLARITIN) 10 mg tablet Take 10 mg by mouth as needed.  ACETAMINOPHEN (TYLENOL PO) Take 1,000 mg by mouth as needed.  loperamide (IMMODIUM) 2 mg tablet Take 1 Tab by mouth four (4) times daily as needed for Diarrhea.  benzonatate (TESSALON PERLES) 100 mg capsule Take 100 mg by mouth three (3) times daily as needed for Cough.     clonazePAM (KLONOPIN) 0.5 mg tablet Take 1 Tab by mouth two (2) times daily as needed. Max Daily Amount: 1 mg. No current facility-administered medications for this visit. Allergies   Allergen Reactions    Arthrotec 50 [Diclofenac-Misoprostol] Other (comments)     GI upset     Diclofenac Diarrhea     GI upset.  Indomethacin Other (comments)     Gi symptoms     Immunization History   Administered Date(s) Administered    Tdap 02/27/2016         Review of Systems   Constitutional: Positive for chills, fever and malaise/fatigue. HENT: Positive for congestion and sore throat. Negative for sinus pain. Respiratory: Positive for cough. Negative for sputum production and shortness of breath. Cardiovascular: Negative for chest pain and palpitations. Gastrointestinal: Negative for nausea and vomiting. Musculoskeletal: Negative for myalgias. Skin: Negative for rash. Neurological: Positive for headaches. Negative for dizziness and tingling. /83 (BP 1 Location: Left arm, BP Patient Position: Sitting)   Pulse 64   Temp 99.5 °F (37.5 °C) (Oral)   Resp 18   Ht 6' 1\" (1.854 m)   Wt 196 lb (88.9 kg)   SpO2 96%   BMI 25.86 kg/m²   Physical Exam   Constitutional: He is oriented to person, place, and time. He appears well-developed and well-nourished. HENT:   Head: Normocephalic and atraumatic. Right Ear: External ear normal.   Left Ear: External ear normal.   Nose: Mucosal edema present. Right sinus exhibits no maxillary sinus tenderness and no frontal sinus tenderness. Left sinus exhibits no maxillary sinus tenderness and no frontal sinus tenderness. Mouth/Throat: Posterior oropharyngeal erythema present. Neck: Normal range of motion. Neck supple. No thyromegaly present. Cardiovascular: Normal rate and regular rhythm. Pulmonary/Chest: Effort normal and breath sounds normal. He has no wheezes. He has no rales. Lymphadenopathy:     He has no cervical adenopathy. Neurological: He is alert and oriented to person, place, and time.    Skin: Skin is warm and dry. Psychiatric: He has a normal mood and affect. His behavior is normal.   Nursing note and vitals reviewed. ASSESSMENT and PLAN  Diagnoses and all orders for this visit:    1. Upper respiratory tract infection, unspecified type - has been prolonged and has worsened over the past week; will treat for secondary bacterial infection at this point  -     cefdinir (OMNICEF) 300 mg capsule; Take 1 Cap by mouth two (2) times a day. -     pseudoephedrine (SUDAFED) 30 mg tablet; Take 1 Tab by mouth every four (4) hours as needed for Congestion. 2. Stress reaction  -     amitriptyline (ELAVIL) 10 mg tablet; Take 1 Tab by mouth nightly. -     buPROPion XL (WELLBUTRIN XL) 300 mg XL tablet; TAKE 1 TABLET EVERY MORNING    3. Anxiety  -     amitriptyline (ELAVIL) 10 mg tablet; Take 1 Tab by mouth nightly. -     buPROPion XL (WELLBUTRIN XL) 300 mg XL tablet; TAKE 1 TABLET EVERY MORNING    He is a patient of Dr Jennifer Patterson and is planning to establish with a new PCP in another practice.   reviewed diet, exercise and weight control  reviewed medications and side effects in detail

## 2019-07-08 ENCOUNTER — PATIENT MESSAGE (OUTPATIENT)
Dept: INTERNAL MEDICINE CLINIC | Age: 39
End: 2019-07-08

## 2019-08-06 ENCOUNTER — HOSPITAL ENCOUNTER (OUTPATIENT)
Dept: CT IMAGING | Age: 39
Discharge: HOME | End: 2019-08-06
Attending: SPECIALIST
Payer: COMMERCIAL

## 2019-08-06 DIAGNOSIS — J32.9 CHRONIC SINUSITIS: ICD-10-CM

## 2019-08-06 PROCEDURE — 70486 CT MAXILLOFACIAL W/O DYE: CPT

## 2019-11-29 ENCOUNTER — OFFICE VISIT (OUTPATIENT)
Dept: INTERNAL MEDICINE CLINIC | Age: 39
End: 2019-11-29

## 2019-11-29 VITALS
DIASTOLIC BLOOD PRESSURE: 87 MMHG | RESPIRATION RATE: 16 BRPM | TEMPERATURE: 98.6 F | BODY MASS INDEX: 25.84 KG/M2 | SYSTOLIC BLOOD PRESSURE: 120 MMHG | WEIGHT: 195 LBS | HEART RATE: 97 BPM | HEIGHT: 73 IN | OXYGEN SATURATION: 98 %

## 2019-11-29 DIAGNOSIS — D22.9 NUMEROUS MOLES: Primary | ICD-10-CM

## 2019-11-29 DIAGNOSIS — K58.0 IRRITABLE BOWEL SYNDROME WITH DIARRHEA: ICD-10-CM

## 2019-11-29 DIAGNOSIS — R09.82 POSTNASAL DRIP: ICD-10-CM

## 2019-11-29 DIAGNOSIS — F41.9 ANXIETY: ICD-10-CM

## 2019-11-29 DIAGNOSIS — F43.0 STRESS REACTION: ICD-10-CM

## 2019-11-29 RX ORDER — CLONAZEPAM 0.5 MG/1
0.5 TABLET ORAL
Qty: 30 TAB | Refills: 0 | Status: SHIPPED | OUTPATIENT
Start: 2019-11-29 | End: 2019-12-14

## 2019-11-29 RX ORDER — AMITRIPTYLINE HYDROCHLORIDE 10 MG/1
10 TABLET, FILM COATED ORAL
Qty: 30 TAB | Refills: 5 | Status: SHIPPED | OUTPATIENT
Start: 2019-11-29 | End: 2019-12-29

## 2019-11-29 RX ORDER — BUPROPION HYDROCHLORIDE 300 MG/1
TABLET ORAL
Qty: 90 TAB | Refills: 1 | Status: SHIPPED | OUTPATIENT
Start: 2019-11-29 | End: 2020-05-19 | Stop reason: SDUPTHER

## 2019-11-29 NOTE — PROGRESS NOTES
History of Present Illness   Chief Complaint   [facility] Connors is a 42 y.o. male     Scratchy throat-patient reports that he is having a lot of postnasal drip that is causing some sore throat. Denies any fever, chills, shortness of breath. Anxiety-worsens and is associated with his IBS. Takes amitriptyline and Wellbutrin which helps. Takes clonazepam as needed. Has been treated for this for the past 6 to 8 years. IBS diarrhea- also on viberzi, famotidine, loperamide. Has been worse in the last 2 to 3 days due to a lot of stress at work and with the baby coming. Generally triggered by dairy and other certain foods. Chronic sinus issues-had sinus surgery about 8 years ago. Has had several sinus infections since then. Reports that ENT told him to see them the next time he has a sinus infection so that they can culture him    Review of Systems   Constitutional: Negative for chills and fever. HENT: Negative for hearing loss. Eyes: Negative for blurred vision. Respiratory: Negative for shortness of breath. Cardiovascular: Negative for chest pain. Gastrointestinal: Positive for diarrhea. Negative for abdominal pain, blood in stool, constipation, melena, nausea and vomiting. Genitourinary: Negative for dysuria and hematuria. Musculoskeletal: Negative for joint pain. Skin: Negative for rash. Neurological: Negative for headaches. Past Medical History     Allergies   Allergen Reactions    Arthrotec 50 [Diclofenac-Misoprostol] Other (comments)     GI upset     Diclofenac Diarrhea     GI upset.  Indomethacin Other (comments)     Gi symptoms        Current Outpatient Medications   Medication Sig    amitriptyline (ELAVIL) 10 mg tablet Take 1 Tab by mouth nightly for 30 days.     buPROPion XL (WELLBUTRIN XL) 300 mg XL tablet TAKE 1 TABLET EVERY MORNING    clonazePAM (KLONOPIN) 0.5 mg tablet Take 1 Tab by mouth two (2) times daily as needed (anxiety) for up to 15 days. Max Daily Amount: 1 mg.  VIBERZI 100 mg tablet Take 100 mg by mouth two (2) times daily (with meals).  famotidine (PEPCID AC) 10 mg tablet Take 10 mg by mouth daily.  ACETAMINOPHEN (TYLENOL PO) Take 1,000 mg by mouth as needed.  loperamide (IMMODIUM) 2 mg tablet Take 1 Tab by mouth four (4) times daily as needed for Diarrhea. No current facility-administered medications for this visit. Patient Active Problem List   Diagnosis Code    Non-seasonal allergic rhinitis due to pollen J30.1    Anxiety F41.9    IBS (irritable bowel syndrome) K58.9     Past Surgical History:   Procedure Laterality Date    SINUS SURGERY PROC UNLISTED  12/2010      Social History     Tobacco Use    Smoking status: Never Smoker    Smokeless tobacco: Never Used   Substance Use Topics    Alcohol use: Yes     Alcohol/week: 1.7 standard drinks     Types: 1 Cans of beer, 1 Shots of liquor per week     Comment: very rare       Family History   Problem Relation Age of Onset    Other Father         ?guillan barre    Breast Cancer Maternal Grandmother     Hypertension Maternal Grandmother     Diabetes Maternal Grandmother     Stroke Maternal Grandmother     Cancer Paternal Grandmother         unknown    Heart Attack Paternal Grandfather 36        Physical Exam   Vitals:       Visit Vitals  /87 (BP 1 Location: Left arm, BP Patient Position: Sitting)   Pulse 97   Temp 98.6 °F (37 °C) (Oral)   Resp 16   Ht 6' 1\" (1.854 m)   Wt 195 lb (88.5 kg)   SpO2 98%   BMI 25.73 kg/m²        Physical Exam  Constitutional:       General: He is not in acute distress. HENT:      Right Ear: Tympanic membrane, ear canal and external ear normal.      Left Ear: Tympanic membrane, ear canal and external ear normal.      Nose: Nose normal.      Mouth/Throat:      Mouth: Mucous membranes are moist.      Pharynx: No posterior oropharyngeal erythema.       Comments: Cobblestoning noted  Eyes:      Conjunctiva/sclera: Conjunctivae normal.   Neck:      Musculoskeletal: Neck supple. Thyroid: No thyromegaly. Cardiovascular:      Rate and Rhythm: Normal rate and regular rhythm. Heart sounds: No murmur. No friction rub. No gallop. Pulmonary:      Effort: No respiratory distress. Breath sounds: No wheezing or rales. Abdominal:      General: Bowel sounds are normal. There is no distension. Palpations: Abdomen is soft. Tenderness: There is no tenderness. Lymphadenopathy:      Cervical: No cervical adenopathy. Skin:     General: Skin is warm. Findings: No rash. Comments: Scattered moles   Neurological:      Mental Status: He is alert and oriented to person, place, and time. Psychiatric:         Mood and Affect: Affect normal.         Judgment: Judgment normal.          Assessment and Plan   Diagnoses and all orders for this visit:    1. Numerous moles  -     REFERRAL TO DERMATOLOGY  Recommend yearly skin checks    2. Stress reaction  -     amitriptyline (ELAVIL) 10 mg tablet; Take 1 Tab by mouth nightly for 30 days. -     buPROPion XL (WELLBUTRIN XL) 300 mg XL tablet; TAKE 1 TABLET EVERY MORNING  -     clonazePAM (KLONOPIN) 0.5 mg tablet; Take 1 Tab by mouth two (2) times daily as needed (anxiety) for up to 15 days. Max Daily Amount: 1 mg.  profile was accessed online for Lucrecia April and reviewed by me during this encounter. I did not see evidence of inappropriate or suspicious controlled substance prescription activity. 3. Anxiety  -     amitriptyline (ELAVIL) 10 mg tablet; Take 1 Tab by mouth nightly for 30 days. -     buPROPion XL (WELLBUTRIN XL) 300 mg XL tablet; TAKE 1 TABLET EVERY MORNING  -     clonazePAM (KLONOPIN) 0.5 mg tablet; Take 1 Tab by mouth two (2) times daily as needed (anxiety) for up to 15 days. Max Daily Amount: 1 mg.    4. Irritable bowel syndrome with diarrhea  Continue medications as directed by GI    5.  Postnasal drip  Recommend using Flonase       Benefits, risks, possible drug interactions, and side effects of all new medications were reviewed with the patient. Pt verbalized understanding.     Return to clinic: 6 months for anxiety refills    Angeline Hammans, MD  [facility]  11/29/2019    Future Appointments   Date Time Provider Triston Kohli   0/32/7721  3:91 PM Kenn Salas MD [address] [address]

## 2020-05-19 ENCOUNTER — HOSPITAL ENCOUNTER (OUTPATIENT)
Dept: LAB | Age: 40
Discharge: HOME | End: 2020-05-19

## 2020-05-19 ENCOUNTER — OFFICE VISIT (OUTPATIENT)
Dept: INTERNAL MEDICINE CLINIC | Age: 40
End: 2020-05-19

## 2020-05-19 VITALS
HEIGHT: 73 IN | HEART RATE: 81 BPM | RESPIRATION RATE: 18 BRPM | BODY MASS INDEX: 26.37 KG/M2 | OXYGEN SATURATION: 95 % | WEIGHT: 199 LBS | DIASTOLIC BLOOD PRESSURE: 80 MMHG | SYSTOLIC BLOOD PRESSURE: 123 MMHG | TEMPERATURE: 98.6 F

## 2020-05-19 DIAGNOSIS — K58.0 IRRITABLE BOWEL SYNDROME WITH DIARRHEA: ICD-10-CM

## 2020-05-19 DIAGNOSIS — F41.9 ANXIETY: ICD-10-CM

## 2020-05-19 DIAGNOSIS — F41.9 ANXIETY: Primary | ICD-10-CM

## 2020-05-19 LAB
ANION GAP SERPL CALC-SCNC: 5 MMOL/L (ref 5–15)
BUN SERPL-MCNC: 13 MG/DL (ref 6–20)
BUN/CREAT SERPL: 10 (ref 12–20)
CALCIUM SERPL-MCNC: 8.9 MG/DL (ref 8.5–10.1)
CHLORIDE SERPL-SCNC: 104 MMOL/L (ref 97–108)
CO2 SERPL-SCNC: 30 MMOL/L (ref 21–32)
CREAT SERPL-MCNC: 1.27 MG/DL (ref 0.7–1.3)
GLUCOSE SERPL-MCNC: 99 MG/DL (ref 65–100)
POTASSIUM SERPL-SCNC: 4.3 MMOL/L (ref 3.5–5.1)
SODIUM SERPL-SCNC: 139 MMOL/L (ref 136–145)

## 2020-05-19 RX ORDER — BUPROPION HYDROCHLORIDE 300 MG/1
TABLET ORAL
Qty: 90 TAB | Refills: 3 | Status: SHIPPED | OUTPATIENT
Start: 2020-05-19 | End: 2021-05-04

## 2020-05-19 RX ORDER — AMITRIPTYLINE HYDROCHLORIDE 10 MG/1
TABLET, FILM COATED ORAL
Qty: 30 TAB | Refills: 11 | Status: SHIPPED | OUTPATIENT
Start: 2020-05-19 | End: 2020-10-12 | Stop reason: SDUPTHER

## 2020-05-19 RX ORDER — AMITRIPTYLINE HYDROCHLORIDE 10 MG/1
TABLET, FILM COATED ORAL
COMMUNITY
Start: 2020-04-21 | End: 2020-05-19 | Stop reason: SDUPTHER

## 2020-05-19 NOTE — PROGRESS NOTES
Assessment and Plan   Diagnoses and all orders for this visit:    1. Anxiety  -     METABOLIC PANEL, BASIC; Future  -     buPROPion XL (WELLBUTRIN XL) 300 mg XL tablet; TAKE 1 TABLET EVERY MORNING    2. Irritable bowel syndrome with diarrhea  -     amitriptyline (ELAVIL) 10 mg tablet; TAKE 1 TABLET NIGHTLY  Well-controlled. Refills provided. Benefits, risks, possible drug interactions, and side effects of all new medications were reviewed with the patient. Pt verbalized understanding. Return to clinic: 1 year for physical or earlier as needed  textmetixhart message sent. Cr 1.27 from 0.83 from 6 years ago. Recheck in a year. Iram Bhatt MD  [facility]  5/19/2020    No future appointments. Subjective   Chief Complaint   Anxiety follow-up    Ai Zurita is a 42 y.o. male     Had a baby boy December 29. Anxiety/IBS- working well for him. Still has occasional symptoms that are also potentially food related but well controlled overall. Compliant with Elavil, Wellbutrin, Viberzi. Using Klonopin sparingly. Review of Systems   Constitutional: Negative for chills and fever. Respiratory: Negative for shortness of breath. Cardiovascular: Negative for chest pain. Objective   Vitals:       Visit Vitals  /80 (BP 1 Location: Left arm, BP Patient Position: Sitting)   Pulse 81   Temp 98.6 °F (37 °C) (Oral)   Resp 18   Ht 6' 1\" (1.854 m)   Wt 199 lb (90.3 kg)   SpO2 95%   BMI 26.25 kg/m²        Physical Exam  Constitutional:       Appearance: Normal appearance. He is not ill-appearing. HENT:      Head: Normocephalic and atraumatic. Right Ear: External ear normal.      Left Ear: External ear normal.      Nose: Nose normal.      Mouth/Throat:      Mouth: Mucous membranes are moist.   Eyes:      General:         Right eye: No discharge. Left eye: No discharge. Extraocular Movements: Extraocular movements intact. Conjunctiva/sclera: Conjunctivae normal.   Neck:      Musculoskeletal: Normal range of motion. Cardiovascular:      Rate and Rhythm: Normal rate and regular rhythm. Heart sounds: No murmur. No friction rub. No gallop. Pulmonary:      Effort: Pulmonary effort is normal. No respiratory distress. Breath sounds: No wheezing, rhonchi or rales. Musculoskeletal:      Comments: Slight antalgic gait (reports he sprained his ankle while coughing the other day)   Skin:     Comments: Diffuse and scattered nevi   Neurological:      Mental Status: He is alert and oriented to person, place, and time. Comments: No obvious facial asymmetry   Psychiatric:         Mood and Affect: Mood normal.         Thought Content:  Thought content normal.         Judgment: Judgment normal.          Voice recognition software is utilized and this note may contain transcription errors

## 2020-10-12 DIAGNOSIS — K58.0 IRRITABLE BOWEL SYNDROME WITH DIARRHEA: ICD-10-CM

## 2020-10-12 RX ORDER — AMITRIPTYLINE HYDROCHLORIDE 10 MG/1
TABLET, FILM COATED ORAL
Qty: 30 TAB | Refills: 11 | Status: SHIPPED | OUTPATIENT
Start: 2020-10-12 | End: 2021-05-14 | Stop reason: SDUPTHER

## 2020-11-13 ENCOUNTER — TELEPHONE (OUTPATIENT)
Dept: INTERNAL MEDICINE CLINIC | Age: 40
End: 2020-11-13

## 2020-11-13 NOTE — TELEPHONE ENCOUNTER
----- Message from Gordo Jelani Warner sent at 11/12/2020  4:07 PM EST -----  Regarding: Dr. Tien Vieira Message/Vendor Calls    Caller's first and last name: Ginger Davis from [facility]      Reason for call: Medical Records      Callback required yes/no and why: Yes      Best contact number(s): [phone]      Details to clarify the request: Ginger Davis from [address] [place] [address] following-up in reference to medical records request sent via fax for pt and requesting confirmation of request.       Gordo Jelani Warner

## 2021-05-03 DIAGNOSIS — F41.9 ANXIETY: ICD-10-CM

## 2021-05-04 RX ORDER — BUPROPION HYDROCHLORIDE 300 MG/1
TABLET ORAL
Qty: 90 TAB | Refills: 0 | Status: SHIPPED | OUTPATIENT
Start: 2021-05-04 | End: 2021-06-16

## 2021-05-04 NOTE — TELEPHONE ENCOUNTER
Call made to patient to advise of providers message. LVM to advise that a 90 day supply was sent to his pharmacy and physical is needed. Mychart message sent as well.

## 2021-05-14 DIAGNOSIS — K58.0 IRRITABLE BOWEL SYNDROME WITH DIARRHEA: ICD-10-CM

## 2021-05-14 NOTE — LETTER
5/17/2021 9:20 AM 
 
Mr. Priyanka Lindquist [address] [facility] [address] [id number] Our records indicate that you are due for an completed physical with Dr Brian Alcaraz for a medication refill amitriptyline (ELAVIL) 10 mg tablet. Please call our office at [phone] and we will be happy to help schedule that appointment for you. Please schedule your appointment before (Aug/2021) for further medication refills. Sincerely, Tamela Garza RN

## 2021-05-17 RX ORDER — AMITRIPTYLINE HYDROCHLORIDE 10 MG/1
TABLET, FILM COATED ORAL
Qty: 90 TAB | Refills: 0 | Status: SHIPPED | OUTPATIENT
Start: 2021-05-17 | End: 2021-09-27

## 2021-05-17 NOTE — TELEPHONE ENCOUNTER
Call made to patient ; no answer--lvm to advise that a completed physical is needed for further refills for medication. My chart message sent , Letter sent to home as well.
[id number] Comprehensive

## 2021-05-26 ENCOUNTER — VIRTUAL VISIT (OUTPATIENT)
Dept: INTERNAL MEDICINE CLINIC | Age: 41
End: 2021-05-26
Payer: COMMERCIAL

## 2021-05-26 DIAGNOSIS — J40 BRONCHITIS: Primary | ICD-10-CM

## 2021-05-26 PROCEDURE — 99213 OFFICE O/P EST LOW 20 MIN: CPT | Performed by: INTERNAL MEDICINE

## 2021-05-26 RX ORDER — DOXYCYCLINE 100 MG/1
100 CAPSULE ORAL 2 TIMES DAILY
Qty: 14 CAPSULE | Refills: 0 | Status: SHIPPED | OUTPATIENT
Start: 2021-05-26 | End: 2021-06-02

## 2021-05-26 RX ORDER — BENZONATATE 100 MG/1
100 CAPSULE ORAL
Qty: 42 CAPSULE | Refills: 0 | Status: SHIPPED | OUTPATIENT
Start: 2021-05-26 | End: 2021-06-09

## 2021-05-26 NOTE — ASSESSMENT & PLAN NOTE
Developed cold symptoms and a cough earlier this month. Had a telemedicine appointment with his insurance. Covid negative. They gave him 7 days of Augmentin and Tessalon Perles. Reports that his symptoms have improved but his cough has gotten worse over the past 48 hours. Continues to have postnasal drip. No shortness of breath, fever, chills, abdominal pain, nausea, vomiting, diarrhea, constipation over his usual IBS symptoms. Tessalon Perles are helping with the cough. Given worsening cough, recommend another round of antibiotics. Doxycycline sent. Patient reports Z-Paks do not work well for him. Tessalon Perles sent. Okay to continue Mucinex. Advised to call if symptoms do not improve.

## 2021-05-26 NOTE — PROGRESS NOTES
Consent: Jaun Torres, who was seen by synchronous (real-time) audio-video technology, and/or his healthcare decision maker, is aware that this patient-initiated, Telehealth encounter on 5/26/2021 is a billable service, with coverage as determined by his insurance carrier. He is aware that he may receive a bill and has provided verbal consent to proceed: Yes. I was in the office while conducting this encounter. Patient identification was verified at the start of the visit: YES  This visit was done with doxy. me  The patient is located in [place] during this visit    Note   Chief Complaint   Worsening cough    Jaun Torres is a 47 y.o. male     1. Bronchitis  Assessment & Plan:  Developed cold symptoms and a cough earlier this month. Had a telemedicine appointment with his insurance. Covid negative. They gave him 7 days of Augmentin and Tessalon Perles. Reports that his symptoms have improved but his cough has gotten worse over the past 48 hours. Continues to have postnasal drip. No shortness of breath, fever, chills, abdominal pain, nausea, vomiting, diarrhea, constipation over his usual IBS symptoms. Tessalon Perles are helping with the cough. Given worsening cough, recommend another round of antibiotics. Doxycycline sent. Patient reports Z-Paks do not work well for him. Tessalon Perles sent. Okay to continue Mucinex. Advised to call if symptoms do not improve. Orders:  -     doxycycline (VIBRAMYCIN) 100 mg capsule; Take 1 Capsule by mouth two (2) times a day for 7 days. , Normal, Disp-14 Capsule, R-0  -     benzonatate (Tessalon Perles) 100 mg capsule; Take 1 Capsule by mouth three (3) times daily as needed for Cough for up to 14 days. , Normal, Disp-42 Capsule, R-0       We discussed the expected course, resolution and complications of the diagnosis(es) in detail. Medication risks, benefits, costs, interactions, and alternatives were discussed as indicated.   I advised him to contact the office if his condition worsens, changes or fails to improve as anticipated. He expressed understanding with the diagnosis(es) and plan. Return to clinic: Earliest convenience for physical, said he had labs done with his insurance and he will send those over through my chart    Jared Weathers MD  [facility]  5/26/2021    No future appointments. Objective   Vitals:       Patient-Reported Vitals 5/26/2021   Patient-Reported Temperature 97.9                 Physical Exam  Constitutional:       Appearance: Normal appearance. He is not ill-appearing. HENT:      Mouth/Throat:      Comments: Slightly hoarse voice  Pulmonary:      Effort: No respiratory distress. Neurological:      Mental Status: He is alert. Current Outpatient Medications   Medication Sig    doxycycline (VIBRAMYCIN) 100 mg capsule Take 1 Capsule by mouth two (2) times a day for 7 days.  benzonatate (Tessalon Perles) 100 mg capsule Take 1 Capsule by mouth three (3) times daily as needed for Cough for up to 14 days.  amitriptyline (ELAVIL) 10 mg tablet TAKE 1 TABLET NIGHTLY    buPROPion XL (WELLBUTRIN XL) 300 mg XL tablet TAKE 1 TABLET BY MOUTH EVERY DAY IN THE MORNING. Schedule physical for refills    VIBERZI 100 mg tablet Take 100 mg by mouth two (2) times daily (with meals).  famotidine (PEPCID AC) 10 mg tablet Take 10 mg by mouth daily.  ACETAMINOPHEN (TYLENOL PO) Take 1,000 mg by mouth as needed.  loperamide (IMMODIUM) 2 mg tablet Take 1 Tab by mouth four (4) times daily as needed for Diarrhea. No current facility-administered medications for this visit. Shawna Angelucci is a 47 y.o. male being evaluated by a video visit encounter for concerns as above. A caregiver was present when appropriate.  Due to this being a TeleHealth encounter (During KDN-02 public health emergency), evaluation of the following organ systems was limited: Vitals/Constitutional/EENT/Resp/CV/GI//MS/Neuro/Skin/Heme-Lymph-Imm. Pursuant to the emergency declaration under the [address] [place] [address], [facility] waiver authority and the [employer] and [employer] Act, this Virtual  Visit was conducted, with patient's (and/or legal guardian's) consent, to reduce the patient's risk of exposure to COVID-19 and provide necessary medical care. Services were provided through a video synchronous discussion virtually to substitute for in-person clinic visit.

## 2021-05-26 NOTE — PROGRESS NOTES
VV-Pt is aware of billable appt depending on insurance plan. Preferred number [phone]    Pt verbally agrees to labs, orders, and others to be mailed to confirmed home address. Identified pt with two pt identifiers(name and ). Reviewed record in preparation for visit and have obtained necessary documentation. All patient medications has been reviewed. Chief Complaint   Patient presents with    URI     pt did a virtual visit with his insurance 2 weeks ago an was put on Augmentin pt is still experinecing post nasal drainage and a cough. Pt did have a negative COCIVD test on 21    Sinus Infection       Health Maintenance Review: Patient reminded of \"due or due soon\" health maintenance. I have asked the patient to contact his/her primary care provider (PCP) for follow-up on his/her health maintenance. Patient-Reported Vitals 2021   Patient-Reported Temperature 97.9        Wt Readings from Last 3 Encounters:   20 199 lb (90.3 kg)   19 195 lb (88.5 kg)   19 196 lb (88.9 kg)     Temp Readings from Last 3 Encounters:   20 98.6 °F (37 °C) (Oral)   19 98.6 °F (37 °C) (Oral)   19 99.5 °F (37.5 °C) (Oral)     BP Readings from Last 3 Encounters:   20 123/80   19 120/87   19 123/83     Pulse Readings from Last 3 Encounters:   20 81   19 97   19 64         Coordination of Care Questionnaire:   1) Have you been to an emergency room, urgent care, or hospitalized since your last visit? No    2. Have seen or consulted any other health care provider since your last visit?  Yes

## 2021-06-15 DIAGNOSIS — F41.9 ANXIETY: ICD-10-CM

## 2021-06-16 RX ORDER — BUPROPION HYDROCHLORIDE 300 MG/1
TABLET ORAL
Qty: 90 TABLET | Refills: 0 | Status: SHIPPED | OUTPATIENT
Start: 2021-06-16 | End: 2021-12-09

## 2021-07-16 ENCOUNTER — OFFICE VISIT (OUTPATIENT)
Dept: INTERNAL MEDICINE CLINIC | Age: 41
End: 2021-07-16
Payer: COMMERCIAL

## 2021-07-16 VITALS
SYSTOLIC BLOOD PRESSURE: 127 MMHG | TEMPERATURE: 99 F | RESPIRATION RATE: 14 BRPM | HEIGHT: 73 IN | HEART RATE: 80 BPM | WEIGHT: 195.8 LBS | OXYGEN SATURATION: 99 % | BODY MASS INDEX: 25.95 KG/M2 | DIASTOLIC BLOOD PRESSURE: 83 MMHG

## 2021-07-16 DIAGNOSIS — Z00.00 WELL ADULT EXAM: Primary | ICD-10-CM

## 2021-07-16 DIAGNOSIS — M79.10 MUSCLE PAIN: ICD-10-CM

## 2021-07-16 DIAGNOSIS — K58.0 IRRITABLE BOWEL SYNDROME WITH DIARRHEA: ICD-10-CM

## 2021-07-16 DIAGNOSIS — F41.9 ANXIETY: ICD-10-CM

## 2021-07-16 DIAGNOSIS — D22.9 NUMEROUS MOLES: ICD-10-CM

## 2021-07-16 DIAGNOSIS — E78.5 HYPERLIPIDEMIA, UNSPECIFIED HYPERLIPIDEMIA TYPE: ICD-10-CM

## 2021-07-16 PROBLEM — J40 BRONCHITIS: Status: RESOLVED | Noted: 2021-05-26 | Resolved: 2021-07-16

## 2021-07-16 PROCEDURE — 99395 PREV VISIT EST AGE 18-39: CPT | Performed by: INTERNAL MEDICINE

## 2021-07-16 PROCEDURE — 99214 OFFICE O/P EST MOD 30 MIN: CPT | Performed by: INTERNAL MEDICINE

## 2021-07-16 RX ORDER — CHOLECALCIFEROL (VITAMIN D3) 50 MCG
CAPSULE ORAL
COMMUNITY

## 2021-07-16 NOTE — ASSESSMENT & PLAN NOTE
11.29/19 - worsens and is associated with his IBS. Takes amitriptyline and Wellbutrin which helps. Takes clonazepam as needed. Has been treated for this for the past 6 to 8 years. 5/19/20 -  working well for him. Still has occasional symptoms that are also potentially food related but well controlled overall. Compliant with Elavil, Wellbutrin, Viberzi. Using Klonopin sparingly.  ====  Overall, Wellbutrin working well for him. He is interested in considering coming off amitriptyline. Advised to hold off given his current IBS symptoms but can consider once the symptoms improve.

## 2021-07-16 NOTE — ASSESSMENT & PLAN NOTE
Not yet interested in the Covid vaccine. Otherwise up-to-date on vaccines.   Encouraged healthy habits

## 2021-07-16 NOTE — ASSESSMENT & PLAN NOTE
11/29/19 - had sinus surgery about 8 years ago. Has had several sinus infections since then.   Reports that ENT told him to see them the next time he has a sinus infection so that they can culture him

## 2021-07-16 NOTE — ASSESSMENT & PLAN NOTE
Has noted increased food intolerance. Also had worsening symptoms a week ago with \"stomach irritation\" described as 34 bowel movements. Last 1 was liquid. No blood, fever, chills. Has been feeling more gassy and bowel urgency. Feels like an IBS flare. Recommend Xifaxan. Continue Wellbutrin 300 mg, amitriptyline 10 mg, and Viberzi 100 twice a day. He has follow-up with GI next month.

## 2021-07-16 NOTE — PROGRESS NOTES
Assessment and Plan     1. Well adult exam  Assessment & Plan:  Not yet interested in the Covid vaccine. Otherwise up-to-date on vaccines. Encouraged healthy habits  2. Irritable bowel syndrome with diarrhea  Assessment & Plan:  Has noted increased food intolerance. Also had worsening symptoms a week ago with \"stomach irritation\" described as 34 bowel movements. Last 1 was liquid. No blood, fever, chills. Has been feeling more gassy and bowel urgency. Feels like an IBS flare. Recommend Xifaxan. Continue Wellbutrin 300 mg, amitriptyline 10 mg, and Viberzi 100 twice a day. He has follow-up with GI next month. Orders:  -     rifAXIMin (Xifaxan) 550 mg tablet; Take 1 Tablet by mouth three (3) times daily for 14 days. , Normal, Disp-42 Tablet, R-0  3. Anxiety  Assessment & Plan:  11.29/19 - worsens and is associated with his IBS. Takes amitriptyline and Wellbutrin which helps. Takes clonazepam as needed. Has been treated for this for the past 6 to 8 years. 5/19/20 -  working well for him. Still has occasional symptoms that are also potentially food related but well controlled overall. Compliant with Elavil, Wellbutrin, Viberzi. Using Klonopin sparingly.  ====  Overall, Wellbutrin working well for him. He is interested in considering coming off amitriptyline. Advised to hold off given his current IBS symptoms but can consider once the symptoms improve. 4. 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 Hyperlipidemia, unspecified hyperlipidemia type  Assessment & Plan:   noted on labs from 11/2020 scanned into the chart. Advised monitoring, diet/exercise  6. Muscle pain  Assessment & Plan:  Was advised by his wife to bring this upreports that when he turns to the right in the car to attend to his children, he gets a sharp lower rib pain that goes away after a minute.   Pain does not occur with any other movements in any other situations. No shortness of breath, cough  Likely muscle irritation without movement. Recommend monitoring. If symptoms worsen or if he develops any other symptoms, consider imaging       Benefits, risks, possible drug interactions, and side effects of all new medications were reviewed with the patient. Pt verbalized understanding. Return to clinic: 1 year for physical or earlier if needed    An electronic signature was used to authenticate this note. Orlando Jaen MD  [facility]  7/16/2021    No future appointments. History of Present Illness   Chief Complaint   Physical    Zainab Mccoy is a 47 y.o. male         Review of Systems   Constitutional: Negative for chills and fever. HENT: Negative for hearing loss. Eyes: Negative for blurred vision. Respiratory: Negative for shortness of breath. Cardiovascular: Negative for chest pain. Gastrointestinal: Positive for abdominal pain and diarrhea. Negative for blood in stool, constipation, melena, nausea and vomiting. Genitourinary: Negative for dysuria and hematuria. Musculoskeletal: Negative for joint pain. Skin: Negative for rash. Neurological: Negative for headaches. Past Medical History     Allergies   Allergen Reactions    Arthrotec 50 [Diclofenac-Misoprostol] Other (comments)     GI upset     Diclofenac Diarrhea     GI upset.  Indomethacin Other (comments)     Gi symptoms        Current Outpatient Medications   Medication Sig    B.infantis-B.ani-B.long-B.bifi (Probiotic 4X) 10-15 mg TbEC Take  by mouth.  rifAXIMin (Xifaxan) 550 mg tablet Take 1 Tablet by mouth three (3) times daily for 14 days.  buPROPion XL (WELLBUTRIN XL) 300 mg XL tablet TAKE 1 TABLET BY MOUTH EVERY DAY IN THE MORNING.  SCHEDULE PHYSICAL FOR REFILLS    amitriptyline (ELAVIL) 10 mg tablet TAKE 1 TABLET NIGHTLY    VIBERZI 100 mg tablet Take 100 mg by mouth two (2) times daily (with meals).  famotidine (PEPCID AC) 10 mg tablet Take 10 mg by mouth daily.  ACETAMINOPHEN (TYLENOL PO) Take 1,000 mg by mouth as needed.  loperamide (IMMODIUM) 2 mg tablet Take 1 Tab by mouth four (4) times daily as needed for Diarrhea. No current facility-administered medications for this visit. Patient Active Problem List   Diagnosis Code    Non-seasonal allergic rhinitis due to pollen J30.1    Anxiety F41.9    Irritable bowel syndrome with diarrhea K58.0    Numerous moles D22.9    Hyperlipidemia E78.5    Well adult exam Z00.00    Muscle pain M79.10     Past Surgical History:   Procedure Laterality Date    CA SINUS SURGERY PROC UNLISTED  12/2010      Social History     Tobacco Use    Smoking status: Never Smoker    Smokeless tobacco: Never Used   Substance Use Topics    Alcohol use: Yes     Comment: once a month      Family History   Problem Relation Age of Onset    Other Father         ?guillan barre    Breast Cancer Maternal Grandmother         with mets to bone    Hypertension Maternal Grandmother     Diabetes Maternal Grandmother     Stroke Maternal Grandmother     Cancer Paternal Grandmother         unknown    Heart Attack Paternal Grandfather 36        Physical Exam   Vitals:       Visit Vitals  /83 (BP 1 Location: Left upper arm, BP Patient Position: Sitting, BP Cuff Size: Adult)   Pulse 80   Temp 99 °F (37.2 °C) (Oral)   Resp 14   Ht 6' 1\" (1.854 m)   Wt 195 lb 12.8 oz (88.8 kg)   SpO2 99%   BMI 25.83 kg/m²        Physical Exam  Constitutional:       General: He is not in acute distress. Appearance: He is well-developed. HENT:      Right Ear: Tympanic membrane, ear canal and external ear normal.      Left Ear: Tympanic membrane, ear canal and external ear normal.   Eyes:      Extraocular Movements: Extraocular movements intact. Conjunctiva/sclera: Conjunctivae normal.   Cardiovascular:      Rate and Rhythm: Normal rate and regular rhythm. Pulses: Normal pulses. Heart sounds: No murmur heard. No friction rub. No gallop. Pulmonary:      Effort: No respiratory distress. Breath sounds: No wheezing, rhonchi or rales. Abdominal:      General: Bowel sounds are normal. There is no distension. Palpations: Abdomen is soft. There is no hepatomegaly, splenomegaly or mass. Tenderness: There is abdominal tenderness (Mild generalized tenderness). There is no guarding. Musculoskeletal:      Cervical back: Neck supple. Skin:     General: Skin is warm. Findings: No rash. Neurological:      Mental Status: He is alert.

## 2021-07-16 NOTE — ASSESSMENT & PLAN NOTE
Was advised by his wife to bring this upreports that when he turns to the right in the car to attend to his children, he gets a sharp lower rib pain that goes away after a minute. Pain does not occur with any other movements in any other situations. No shortness of breath, cough  Likely muscle irritation without movement. Recommend monitoring.   If symptoms worsen or if he develops any other symptoms, consider imaging

## 2021-09-25 DIAGNOSIS — K58.0 IRRITABLE BOWEL SYNDROME WITH DIARRHEA: ICD-10-CM

## 2021-09-27 RX ORDER — AMITRIPTYLINE HYDROCHLORIDE 10 MG/1
TABLET, FILM COATED ORAL
Qty: 90 TABLET | Refills: 1 | Status: SHIPPED | OUTPATIENT
Start: 2021-09-27 | End: 2022-03-25

## 2021-12-09 DIAGNOSIS — F41.9 ANXIETY: ICD-10-CM

## 2021-12-09 RX ORDER — BUPROPION HYDROCHLORIDE 300 MG/1
300 TABLET ORAL DAILY
Qty: 90 TABLET | Refills: 1 | Status: SHIPPED | OUTPATIENT
Start: 2021-12-09 | End: 2022-06-10

## 2022-01-02 ENCOUNTER — TELEPHONE (OUTPATIENT)
Dept: INTERNAL MEDICINE CLINIC | Age: 42
End: 2022-01-02

## 2022-01-02 RX ORDER — DOXYCYCLINE 100 MG/1
100 CAPSULE ORAL 2 TIMES DAILY
Qty: 14 CAPSULE | Refills: 0 | Status: SHIPPED | OUTPATIENT
Start: 2022-01-02 | End: 2022-01-09

## 2022-01-02 NOTE — TELEPHONE ENCOUNTER
Reports that he recently was prescribed Augmentin on Friday and has developed a rash to arms and waistline. Itchy. Has had URI symptoms for about two weeks. Abx prescribed through Virtual visit with insurance. Has extreme fatigue. No Fever/chills. We discussed that it would be rare for Augmentin to cause an allergy, if he has previously tolerated this. Denies any sore throat but has developed a slight cough. He reports that he did have Covid in October. This is a bit different. Discussed that this may not truly be a sinus infection, but given classic symptoms initially we will switch him to doxycycline. Patient to rest and treat symptoms. He will let us know if the fatigue does not improve and we can further evaluate him for other causes including viral URI or mono.

## 2022-01-24 ENCOUNTER — VIRTUAL VISIT (OUTPATIENT)
Dept: INTERNAL MEDICINE CLINIC | Age: 42
End: 2022-01-24
Payer: COMMERCIAL

## 2022-01-24 ENCOUNTER — TELEPHONE (OUTPATIENT)
Dept: INTERNAL MEDICINE CLINIC | Age: 42
End: 2022-01-24

## 2022-01-24 DIAGNOSIS — J01.00 SUBACUTE MAXILLARY SINUSITIS: Primary | ICD-10-CM

## 2022-01-24 PROCEDURE — 99213 OFFICE O/P EST LOW 20 MIN: CPT | Performed by: INTERNAL MEDICINE

## 2022-01-24 RX ORDER — PREDNISONE 10 MG/1
TABLET ORAL
Qty: 21 TABLET | Refills: 0 | Status: SHIPPED | OUTPATIENT
Start: 2022-01-24 | End: 2022-01-26 | Stop reason: SDUPTHER

## 2022-01-24 RX ORDER — LEVOFLOXACIN 500 MG/1
500 TABLET, FILM COATED ORAL DAILY
Qty: 7 TABLET | Refills: 0 | Status: SHIPPED | OUTPATIENT
Start: 2022-01-24 | End: 2022-01-31

## 2022-01-24 NOTE — ASSESSMENT & PLAN NOTE
Started around week of dec 13th - started getting sinus drainage/congestion  Lizzy - HA, muscle aches, malaise, fatigue   Took OTC meds   Did a virtual visit through insurance on 12/30th - given augmentin -> caused a rash in armpits and waist area -> called on call to us and was switched to doxycycline   COVID test negative  1/6   Was given 5 day course of pred for rash, hydroxyzine, meclizine   Started feeling a little bit better, rash resolved   But symptoms started coming back last week and now back again  No fevers, SOB   +cough   +sinus pressure, pain, HA   +PND, sore throat especially when waking up   +laryngitis   Has a history of difficulty treating sinus infection in the past - tried calling previous ENT but next appt in Feb  +history of surgery for sinus issues  Levofloxacin and pred pack sent to pharmacy. Will call Dr. Gilda Navarro' office to see if earlier appt possible. If no improvement, consider imaging. Can try sudafed, tylenol, ibuprofen, steroid nasal sprays for symptoms as well.

## 2022-01-24 NOTE — TELEPHONE ENCOUNTER
----- Message from Jag Duenas MD sent at 2/71/8101  9:27 AM EST -----  Regarding: ent  Please call Dr. Stalin Lopez with ENT and see if they're able to schedule him an appt soon - he said the earliest is in Feb. Has persistent sinus infection x 1 month, s/p surgery in the past. Steroids and abx not helping. Wondering if he needs a culture. Are they able to see him earlier than Feb? Please let pt know. Thanks!

## 2022-01-24 NOTE — PROGRESS NOTES
Consent: Shaan Acuña, who was seen by synchronous (real-time) audio-video technology, and/or his healthcare decision maker, is aware that this patient-initiated, Telehealth encounter on 1/24/2022 is a billable service, with coverage as determined by his insurance carrier. He is aware that he may receive a bill and has provided verbal consent to proceed: Yes. I was in the office while conducting this encounter. Patient identification was verified at the start of the visit: YES  This visit was done with doxy. me  The patient is located in [place] during this visit    Note   Chief Complaint   Sinus pressure    Shaan Acuña is a 46 y.o. male     1. Subacute maxillary sinusitis  Assessment & Plan:  Started around week of dec 13th - started getting sinus drainage/congestion  Lizzy - HA, muscle aches, malaise, fatigue   Took OTC meds   Did a virtual visit through insurance on 12/30th - given augmentin -> caused a rash in armpits and waist area -> called on call to us and was switched to doxycycline   COVID test negative  1/6   Was given 5 day course of pred for rash, hydroxyzine, meclizine   Started feeling a little bit better, rash resolved   But symptoms started coming back last week and now back again  No fevers, SOB   +cough   +sinus pressure, pain, HA   +PND, sore throat especially when waking up   +laryngitis   Has a history of difficulty treating sinus infection in the past - tried calling previous ENT but next appt in Feb  +history of surgery for sinus issues  Levofloxacin and pred pack sent to pharmacy. Will call Dr. Steven Schafer' office to see if earlier appt possible. If no improvement, consider imaging. Can try sudafed, tylenol, ibuprofen, steroid nasal sprays for symptoms as well. Orders:  -     levoFLOXacin (LEVAQUIN) 500 mg tablet; Take 1 Tablet by mouth daily for 7 days. , Normal, Disp-7 Tablet, R-0  -     predniSONE (STERAPRED DS) 10 mg dose pack; See administration instruction per 10mg dose pack, Normal, Disp-21 Tablet, R-0         We discussed the expected course, resolution and complications of the diagnosis(es) in detail. Medication risks, benefits, costs, interactions, and alternatives were discussed as indicated. I advised him to contact the office if his condition worsens, changes or fails to improve as anticipated. He expressed understanding with the diagnosis(es) and plan. Return to clinic:  As needed    Nina Lea MD  [facility]  1/24/2022    No future appointments. Objective   Vitals:       Patient-Reported Vitals 1/24/2022   Patient-Reported Weight 195lb   Patient-Reported Temperature -                 Physical Exam  Constitutional:       Comments: Appears tired   HENT:      Nose:      Comments: Nasally voice  Cardiovascular:      Rate and Rhythm: Normal rate. Pulmonary:      Effort: No respiratory distress. Neurological:      Mental Status: He is alert. Current Outpatient Medications   Medication Sig    levoFLOXacin (LEVAQUIN) 500 mg tablet Take 1 Tablet by mouth daily for 7 days.  predniSONE (STERAPRED DS) 10 mg dose pack See administration instruction per 10mg dose pack    buPROPion XL (WELLBUTRIN XL) 300 mg XL tablet Take 1 Tablet by mouth daily. Indications: IBS    amitriptyline (ELAVIL) 10 mg tablet TAKE 1 TABLET BY MOUTH EVERY DAY AT NIGHT    B.infantis-B.ani-B.long-B.bifi (Probiotic 4X) 10-15 mg TbEC Take  by mouth.  VIBERZI 100 mg tablet Take 100 mg by mouth two (2) times daily (with meals).  famotidine (PEPCID AC) 10 mg tablet Take 10 mg by mouth daily.  ACETAMINOPHEN (TYLENOL PO) Take 1,000 mg by mouth as needed.  loperamide (IMMODIUM) 2 mg tablet Take 1 Tab by mouth four (4) times daily as needed for Diarrhea. No current facility-administered medications for this visit. Efrain Perez is a 46 y.o. male being evaluated by a video visit encounter for concerns as above.   A caregiver was present when appropriate. Due to this being a TeleHealth encounter (During SIQRW-33 public health emergency), evaluation of the following organ systems was limited: Vitals/Constitutional/EENT/Resp/CV/GI//MS/Neuro/Skin/Heme-Lymph-Imm. Pursuant to the emergency declaration under the [address] [place] [address], [facility] waiver authority and the [employer] Act, this Virtual  Visit was conducted, with patient's (and/or legal guardian's) consent, to reduce the patient's risk of exposure to COVID-19 and provide necessary medical care. Services were provided through a video synchronous discussion virtually to substitute for in-person clinic visit.

## 2022-01-24 NOTE — TELEPHONE ENCOUNTER
Call made to Dr Brii Monroe office to assist in scheduling patient an earlier appt. Dr Lon Miguel office had an cancellation for this wed 1/6/2022 at 815am patient was scheduled for that appt and made aware of the change. Patient was thankful. Nurse confirmed that patient has address and number to Dr Lon Miguel office if appt changing is needed.

## 2022-01-26 DIAGNOSIS — J01.00 SUBACUTE MAXILLARY SINUSITIS: ICD-10-CM

## 2022-01-26 RX ORDER — PREDNISONE 10 MG/1
TABLET ORAL
Qty: 21 TABLET | Refills: 0 | Status: SHIPPED | OUTPATIENT
Start: 2022-01-26 | End: 2022-05-23 | Stop reason: ALTCHOICE

## 2022-03-18 PROBLEM — M79.10 MUSCLE PAIN: Status: ACTIVE | Noted: 2021-07-16

## 2022-03-18 PROBLEM — Z00.00 WELL ADULT EXAM: Status: ACTIVE | Noted: 2021-07-16

## 2022-03-19 PROBLEM — E78.5 HYPERLIPIDEMIA: Status: ACTIVE | Noted: 2021-07-16

## 2022-03-19 PROBLEM — J01.00 SUBACUTE MAXILLARY SINUSITIS: Status: ACTIVE | Noted: 2022-01-24

## 2022-03-19 PROBLEM — D22.9 NUMEROUS MOLES: Status: ACTIVE | Noted: 2019-11-29

## 2022-03-25 DIAGNOSIS — K58.0 IRRITABLE BOWEL SYNDROME WITH DIARRHEA: ICD-10-CM

## 2022-03-25 RX ORDER — AMITRIPTYLINE HYDROCHLORIDE 10 MG/1
TABLET, FILM COATED ORAL
Qty: 90 TABLET | Refills: 1 | Status: SHIPPED | OUTPATIENT
Start: 2022-03-25 | End: 2022-09-26

## 2022-05-23 ENCOUNTER — VIRTUAL VISIT (OUTPATIENT)
Dept: INTERNAL MEDICINE CLINIC | Age: 42
End: 2022-05-23
Payer: COMMERCIAL

## 2022-05-23 DIAGNOSIS — J01.11 ACUTE RECURRENT FRONTAL SINUSITIS: Primary | ICD-10-CM

## 2022-05-23 PROCEDURE — 99213 OFFICE O/P EST LOW 20 MIN: CPT | Performed by: NURSE PRACTITIONER

## 2022-05-23 RX ORDER — PREDNISONE 10 MG/1
10 TABLET ORAL SEE ADMIN INSTRUCTIONS
Qty: 21 TABLET | Refills: 0 | Status: SHIPPED | OUTPATIENT
Start: 2022-05-23

## 2022-05-23 RX ORDER — CEFDINIR 300 MG/1
300 CAPSULE ORAL 2 TIMES DAILY
Qty: 28 CAPSULE | Refills: 0 | Status: SHIPPED | OUTPATIENT
Start: 2022-05-23

## 2022-05-23 NOTE — PROGRESS NOTES
Gary Lopes (: 1980) is a 46 y.o. male, established patient, here for evaluation of the following chief complaint(s):   Cold Symptoms (sore throat, cough, fatigue, since wednesday)       ASSESSMENT/PLAN:  Below is the assessment and plan developed based on review of pertinent history, labs, studies, and medications. 1. Acute recurrent frontal sinusitis -- has history of prolonged sinusitis that took 4 antibiotics to resolve over this past winter. Has been seen by ENT Dr Jacey Maria in recent past.  -     cefdinir (OMNICEF) 300 mg capsule; Take 1 Capsule by mouth two (2) times a day., Normal, Disp-28 Capsule, R-0  -     predniSONE (STERAPRED DS) 10 mg dose pack; Take 1 Tablet by mouth See Admin Instructions. See administration instruction per 10mg dose pack, Normal, Disp-21 Tablet, R-0      SUBJECTIVE/OBJECTIVE:  HPI    Visit conducted via [employer] me platform. Patient of Dr Chiqui Garner who presents with complaints of fatigue, cough, thick post nasal drainage with infected taste, sinus pressure/pain to frontal sinuses, headache for the past 6 days. Has been struggling with seasonal allergies and developed sore throat on . His son has tested positive for Covid so he took a test as well; both rapid and PCR tests were negative. Gradually felt sinus pressure worsening and mucous has become more purulent. Denies fever, chills. Has been taking Claritin for allergies and started taking Dayquil/Nyquil which has helped with symptoms. Reports having had a prolonged bout of sinusitis in Winter 2021 that took 4 courses of antibiotics to finally eradicate. Reports being on Augmentin, Doxycycline, then Levaquin without resolution; he was placed on Cefdinir by ENT along with oral steroids and infection finally resolved.     Patient Active Problem List   Diagnosis Code    Non-seasonal allergic rhinitis due to pollen J30.1    Anxiety F41.9    Irritable bowel syndrome with diarrhea K58.0    Numerous moles D22.9  Hyperlipidemia E78.5    Well adult exam Z00.00    Muscle pain M79.10    Subacute maxillary sinusitis J01.00     Past Surgical History:   Procedure Laterality Date    CO SINUS SURGERY PROC UNLISTED  12/2010     Social History     Socioeconomic History    Marital status:      Spouse name: Not on file    Number of children: Not on file    Years of education: Not on file    Highest education level: Not on file   Occupational History    Not on file   Tobacco Use    Smoking status: Never Smoker    Smokeless tobacco: Never Used   Vaping Use    Vaping Use: Never used   Substance and Sexual Activity    Alcohol use: Yes     Comment: once a month    Drug use: No    Sexual activity: Yes     Partners: Female   Other Topics Concern    Not on file   Social History Narrative    Not on file     Social Determinants of Health     Financial Resource Strain:     Difficulty of Paying Living Expenses: Not on file   Food Insecurity:     Worried About Running Out of Food in the Last Year: Not on file    Nat of Food in the Last Year: Not on file   Transportation Needs:     Lack of Transportation (Medical): Not on file    Lack of Transportation (Non-Medical):  Not on file   Physical Activity:     Days of Exercise per Week: Not on file    Minutes of Exercise per Session: Not on file   Stress:     Feeling of Stress : Not on file   Social Connections:     Frequency of Communication with Friends and Family: Not on file    Frequency of Social Gatherings with Friends and Family: Not on file    Attends [employer] Services: Not on file    Active Member of Clubs or Organizations: Not on file    Attends Club or Organization Meetings: Not on file    Marital Status: Not on file   Intimate Partner Violence:     Fear of Current or Ex-Partner: Not on file    Emotionally Abused: Not on file    Physically Abused: Not on file    Sexually Abused: Not on file   Housing Stability:     Unable to Pay for Housing in the Last Year: Not on file    Number of Places Lived in the Last Year: Not on file    Unstable Housing in the Last Year: Not on file     Family History   Problem Relation Age of Onset    Other Father         ?Dnate  Breast Cancer Maternal Grandmother         with mets to bone    Hypertension Maternal Grandmother     Diabetes Maternal Grandmother     Stroke Maternal Grandmother     Cancer Paternal Grandmother         unknown    Heart Attack Paternal Grandfather 36     Current Outpatient Medications   Medication Sig    cefdinir (OMNICEF) 300 mg capsule Take 1 Capsule by mouth two (2) times a day.  predniSONE (STERAPRED DS) 10 mg dose pack Take 1 Tablet by mouth See Admin Instructions. See administration instruction per 10mg dose pack    amitriptyline (ELAVIL) 10 mg tablet TAKE 1 TABLET BY MOUTH EVERY DAY AT NIGHT    buPROPion XL (WELLBUTRIN XL) 300 mg XL tablet Take 1 Tablet by mouth daily. Indications: IBS    B.infantis-B.ani-B.long-B.bifi (Probiotic 4X) 10-15 mg TbEC Take  by mouth.  VIBERZI 100 mg tablet Take 100 mg by mouth two (2) times daily (with meals).  famotidine (PEPCID AC) 10 mg tablet Take 10 mg by mouth daily.  ACETAMINOPHEN (TYLENOL PO) Take 1,000 mg by mouth as needed.  loperamide (IMMODIUM) 2 mg tablet Take 1 Tab by mouth four (4) times daily as needed for Diarrhea. No current facility-administered medications for this visit. Allergies   Allergen Reactions    Arthrotec 50 [Diclofenac-Misoprostol] Other (comments)     GI upset     Diclofenac Diarrhea     GI upset.  Indomethacin Other (comments)     Gi symptoms     Immunization History   Administered Date(s) Administered    Tdap 02/27/2016       Review of Systems   Constitutional: Positive for fatigue. Negative for chills and fever. HENT: Positive for congestion, postnasal drip, sinus pressure, sinus pain and sore throat. Respiratory: Positive for cough.  Negative for shortness of breath and wheezing. Cardiovascular: Negative for chest pain. Musculoskeletal: Negative for myalgias. Neurological: Positive for headaches. Negative for dizziness.         Patient-Reported Systolic (Top): 812  Patient-Reported Diastolic (Bottom): 84  Patient-Reported Pulse: 100  Patient-Reported Pulse Oximetry: 98  Patient-Reported Weight: 190       Physical Exam    [INSTRUCTIONS:  \"[x]\" Indicates a positive item  \"[]\" Indicates a negative item  -- DELETE ALL ITEMS NOT EXAMINED]    Constitutional: [x] Appears well-developed and well-nourished [x] No apparent distress      [x] Abnormal - appears tired    Mental status: [x] Alert and awake  [x] Oriented to person/place/time [x] Able to follow commands    [] Abnormal -     Eyes:   EOM    [x]  Normal    [] Abnormal -   Sclera  [x]  Normal    [] Abnormal -          Discharge [x]  None visible   [] Abnormal -     HENT: [x] Normocephalic, atraumatic  [x] Abnormal - pain over frontal sinuses   [x] Mouth/Throat: Mucous membranes are moist    External Ears [x] Normal  [] Abnormal -    Neck: [x] No visualized mass [] Abnormal -     Pulmonary/Chest: [x] Respiratory effort normal   [x] No visualized signs of difficulty breathing or respiratory distress        [x] Abnormal - cough     Musculoskeletal:   [x] Normal gait with no signs of ataxia         [x] Normal range of motion of neck        [] Abnormal -     Neurological:        [x] No Facial Asymmetry (Cranial nerve 7 motor function) (limited exam due to video visit)          [x] No gaze palsy        [] Abnormal -          Skin:        [x] No significant exanthematous lesions or discoloration noted on facial skin         [] Abnormal -            Psychiatric:       [x] Normal Affect [] Abnormal -        [x] No Hallucinations        On this date 05/23/2022 I have spent 20 minutes reviewing previous notes, test results and face to face (virtual) with the patient discussing the diagnosis and importance of compliance with the treatment plan as well as documenting on the day of the visit. Jacky Hardwickcasandra, was evaluated through a synchronous (real-time) audio-video encounter. The patient (or guardian if applicable) is aware that this is a billable service, which includes applicable co-pays. Verbal consent to proceed has been obtained. The visit was conducted pursuant to the emergency declaration under the [address] [place] [address], [address] [place] [address] authority and the [employer] General Act. Patient identification was verified, and a caregiver was present when appropriate. The patient was located at home in a state where the provider was licensed to provide care. An electronic signature was used to authenticate this note.   -- Genia Duncan NP

## 2022-06-10 DIAGNOSIS — F41.9 ANXIETY: ICD-10-CM

## 2022-06-10 RX ORDER — BUPROPION HYDROCHLORIDE 300 MG/1
TABLET ORAL
Qty: 90 TABLET | Refills: 1 | Status: SHIPPED | OUTPATIENT
Start: 2022-06-10

## 2022-06-14 ENCOUNTER — TELEPHONE (OUTPATIENT)
Dept: INTERNAL MEDICINE CLINIC | Age: 42
End: 2022-06-14

## 2022-06-14 RX ORDER — LEVOFLOXACIN 500 MG/1
500 TABLET, FILM COATED ORAL DAILY
Qty: 7 TABLET | Refills: 0 | Status: SHIPPED | OUTPATIENT
Start: 2022-06-14

## 2022-06-14 NOTE — TELEPHONE ENCOUNTER
Patient seen 3 weeks by NP. Patient stated that sinus issues and symptoms are still recurring. Patient has finished course of abx. Chart reviewed by nurse patient has been having sinus issues since winter four different abx given sx not resolved. Patient will call ENT to make a follow up appt. Looks like patient is on NP schedule for 6/16/22. Would you like to send another abx or see patient on Thursday for further evaluation?

## 2022-06-14 NOTE — TELEPHONE ENCOUNTER
Per patient, he is having worsening symptoms from a sinus infection he was seen 05/23 with NP , requesting to speak with the nurse, soonest appt available is Thursday with the NP.     He cam be reached at [phone]

## 2022-07-25 ENCOUNTER — PATIENT MESSAGE (OUTPATIENT)
Dept: INTERNAL MEDICINE CLINIC | Age: 42
End: 2022-07-25

## 2022-07-25 DIAGNOSIS — F41.9 ANXIETY: Primary | ICD-10-CM

## 2022-07-26 RX ORDER — CLONAZEPAM 0.5 MG/1
0.5 TABLET ORAL
Qty: 30 TABLET | Refills: 0 | Status: SHIPPED | OUTPATIENT
Start: 2022-07-26

## 2022-07-26 NOTE — TELEPHONE ENCOUNTER
From: Cathleen Lee  To: Felicity Castillo MD  Sent: 9/00/9096 4:20 PM EDT  Subject: Clonazepam 0.5 MG Tablet Refill    Dr. Lisandro Grover,    I am on the verge of running out of the Clonazepam that you prescribed on 11/29/2019. [facility], and when I added it, it didn't give me the option to request a refill through that screen. [facility] [address] [facility] is still the correct pharmacy.     Thanks,    [employer]

## 2022-09-24 DIAGNOSIS — K58.0 IRRITABLE BOWEL SYNDROME WITH DIARRHEA: ICD-10-CM

## 2022-09-26 RX ORDER — AMITRIPTYLINE HYDROCHLORIDE 10 MG/1
TABLET, FILM COATED ORAL
Qty: 90 TABLET | Refills: 1 | Status: SHIPPED | OUTPATIENT
Start: 2022-09-26

## 2022-12-10 DIAGNOSIS — F41.9 ANXIETY: ICD-10-CM

## 2022-12-11 RX ORDER — BUPROPION HYDROCHLORIDE 300 MG/1
TABLET ORAL
Qty: 90 TABLET | Refills: 1 | Status: SHIPPED | OUTPATIENT
Start: 2022-12-11

## 2023-07-10 DIAGNOSIS — K58.0 IRRITABLE BOWEL SYNDROME WITH DIARRHEA: ICD-10-CM

## 2023-07-10 RX ORDER — AMITRIPTYLINE HYDROCHLORIDE 10 MG/1
TABLET, FILM COATED ORAL
Qty: 90 TABLET | OUTPATIENT
Start: 2023-07-10

## 2024-10-31 ENCOUNTER — HOSPITAL ENCOUNTER (INPATIENT)
Facility: HOSPITAL | Age: 44
LOS: 2 days | Discharge: HOME | End: 2024-11-02
Attending: EMERGENCY MEDICINE | Admitting: INTERNAL MEDICINE
Payer: COMMERCIAL

## 2024-10-31 ENCOUNTER — APPOINTMENT (OUTPATIENT)
Facility: HOSPITAL | Age: 44
End: 2024-10-31
Payer: COMMERCIAL

## 2024-10-31 DIAGNOSIS — K52.9 COLITIS: Primary | ICD-10-CM

## 2024-10-31 PROBLEM — E87.1 HYPONATREMIA: Status: ACTIVE | Noted: 2024-10-31

## 2024-10-31 PROBLEM — K62.5 RECTAL BLEEDING: Status: ACTIVE | Noted: 2024-10-31

## 2024-10-31 PROBLEM — D72.829 LEUKOCYTOSIS: Status: ACTIVE | Noted: 2024-10-31

## 2024-10-31 PROBLEM — N28.9 RENAL INSUFFICIENCY: Status: ACTIVE | Noted: 2024-10-31

## 2024-10-31 PROBLEM — K21.9 GERD (GASTROESOPHAGEAL REFLUX DISEASE): Status: ACTIVE | Noted: 2024-10-31

## 2024-10-31 LAB
ALBUMIN SERPL-MCNC: 4.2 G/DL (ref 3.5–5)
ALBUMIN/GLOB SERPL: 1 (ref 1.1–2.2)
ALP SERPL-CCNC: 108 U/L (ref 45–117)
ALT SERPL-CCNC: 34 U/L (ref 12–78)
ANION GAP SERPL CALC-SCNC: 5 MMOL/L (ref 2–12)
AST SERPL-CCNC: 28 U/L (ref 15–37)
BASOPHILS # BLD: 0 K/UL (ref 0–0.1)
BASOPHILS NFR BLD: 0 % (ref 0–1)
BILIRUB SERPL-MCNC: 0.4 MG/DL (ref 0.2–1)
BUN SERPL-MCNC: 16 MG/DL (ref 6–20)
BUN/CREAT SERPL: 12 (ref 12–20)
CALCIUM SERPL-MCNC: 9.3 MG/DL (ref 8.5–10.1)
CHLORIDE SERPL-SCNC: 101 MMOL/L (ref 97–108)
CO2 SERPL-SCNC: 28 MMOL/L (ref 21–32)
CREAT SERPL-MCNC: 1.39 MG/DL (ref 0.7–1.3)
DIFFERENTIAL METHOD BLD: ABNORMAL
EKG ATRIAL RATE: 70 BPM
EKG DIAGNOSIS: NORMAL
EKG P-R INTERVAL: 136 MS
EKG Q-T INTERVAL: 386 MS
EKG QRS DURATION: 82 MS
EKG QTC CALCULATION (BAZETT): 416 MS
EKG R AXIS: 104 DEGREES
EKG T AXIS: 121 DEGREES
EKG VENTRICULAR RATE: 70 BPM
EOSINOPHIL # BLD: 0 K/UL (ref 0–0.4)
EOSINOPHIL NFR BLD: 0 % (ref 0–7)
ERYTHROCYTE [DISTWIDTH] IN BLOOD BY AUTOMATED COUNT: 13.2 % (ref 11.5–14.5)
GLOBULIN SER CALC-MCNC: 4.2 G/DL (ref 2–4)
GLUCOSE BLD STRIP.AUTO-MCNC: 114 MG/DL (ref 65–117)
GLUCOSE SERPL-MCNC: 133 MG/DL (ref 65–100)
HCT VFR BLD AUTO: 46.6 % (ref 36.6–50.3)
HGB BLD-MCNC: 15.3 G/DL (ref 12.1–17)
IMM GRANULOCYTES # BLD AUTO: 0.2 K/UL (ref 0–0.04)
IMM GRANULOCYTES NFR BLD AUTO: 1 % (ref 0–0.5)
LACTATE BLD-SCNC: 0.96 MMOL/L (ref 0.4–2)
LYMPHOCYTES # BLD: 0.9 K/UL (ref 0.8–3.5)
LYMPHOCYTES NFR BLD: 4 % (ref 12–49)
MCH RBC QN AUTO: 27.1 PG (ref 26–34)
MCHC RBC AUTO-ENTMCNC: 32.8 G/DL (ref 30–36.5)
MCV RBC AUTO: 82.6 FL (ref 80–99)
MONOCYTES # BLD: 0.9 K/UL (ref 0–1)
MONOCYTES NFR BLD: 4 % (ref 5–13)
NEUTS SEG # BLD: 19.9 K/UL (ref 1.8–8)
NEUTS SEG NFR BLD: 91 % (ref 32–75)
NRBC # BLD: 0 K/UL (ref 0–0.01)
NRBC BLD-RTO: 0 PER 100 WBC
PLATELET # BLD AUTO: 335 K/UL (ref 150–400)
PMV BLD AUTO: 11.1 FL (ref 8.9–12.9)
POTASSIUM SERPL-SCNC: 4.5 MMOL/L (ref 3.5–5.1)
PROCALCITONIN SERPL-MCNC: 0.09 NG/ML
PROT SERPL-MCNC: 8.4 G/DL (ref 6.4–8.2)
RBC # BLD AUTO: 5.64 M/UL (ref 4.1–5.7)
RBC MORPH BLD: ABNORMAL
SERVICE CMNT-IMP: NORMAL
SODIUM SERPL-SCNC: 134 MMOL/L (ref 136–145)
TROPONIN I SERPL HS-MCNC: <4 NG/L (ref 0–76)
WBC # BLD AUTO: 21.9 K/UL (ref 4.1–11.1)

## 2024-10-31 PROCEDURE — 6360000002 HC RX W HCPCS: Performed by: INTERNAL MEDICINE

## 2024-10-31 PROCEDURE — 83605 ASSAY OF LACTIC ACID: CPT

## 2024-10-31 PROCEDURE — 2580000003 HC RX 258: Performed by: EMERGENCY MEDICINE

## 2024-10-31 PROCEDURE — 84145 PROCALCITONIN (PCT): CPT

## 2024-10-31 PROCEDURE — 87449 NOS EACH ORGANISM AG IA: CPT

## 2024-10-31 PROCEDURE — 87506 IADNA-DNA/RNA PROBE TQ 6-11: CPT

## 2024-10-31 PROCEDURE — 93005 ELECTROCARDIOGRAM TRACING: CPT | Performed by: EMERGENCY MEDICINE

## 2024-10-31 PROCEDURE — 6360000004 HC RX CONTRAST MEDICATION: Performed by: EMERGENCY MEDICINE

## 2024-10-31 PROCEDURE — 6360000002 HC RX W HCPCS: Performed by: EMERGENCY MEDICINE

## 2024-10-31 PROCEDURE — 36415 COLL VENOUS BLD VENIPUNCTURE: CPT

## 2024-10-31 PROCEDURE — 84484 ASSAY OF TROPONIN QUANT: CPT

## 2024-10-31 PROCEDURE — 87324 CLOSTRIDIUM AG IA: CPT

## 2024-10-31 PROCEDURE — 99285 EMERGENCY DEPT VISIT HI MDM: CPT

## 2024-10-31 PROCEDURE — 83993 ASSAY FOR CALPROTECTIN FECAL: CPT

## 2024-10-31 PROCEDURE — 74174 CTA ABD&PLVS W/CONTRAST: CPT

## 2024-10-31 PROCEDURE — 85025 COMPLETE CBC W/AUTO DIFF WBC: CPT

## 2024-10-31 PROCEDURE — 2580000003 HC RX 258: Performed by: INTERNAL MEDICINE

## 2024-10-31 PROCEDURE — 1100000000 HC RM PRIVATE

## 2024-10-31 PROCEDURE — 82962 GLUCOSE BLOOD TEST: CPT

## 2024-10-31 PROCEDURE — 94761 N-INVAS EAR/PLS OXIMETRY MLT: CPT

## 2024-10-31 PROCEDURE — 87040 BLOOD CULTURE FOR BACTERIA: CPT

## 2024-10-31 PROCEDURE — 6370000000 HC RX 637 (ALT 250 FOR IP): Performed by: INTERNAL MEDICINE

## 2024-10-31 PROCEDURE — 80053 COMPREHEN METABOLIC PANEL: CPT

## 2024-10-31 PROCEDURE — 6360000002 HC RX W HCPCS

## 2024-10-31 PROCEDURE — 93010 ELECTROCARDIOGRAM REPORT: CPT | Performed by: INTERNAL MEDICINE

## 2024-10-31 RX ORDER — ONDANSETRON 4 MG/1
4 TABLET, ORALLY DISINTEGRATING ORAL EVERY 8 HOURS PRN
Status: DISCONTINUED | OUTPATIENT
Start: 2024-10-31 | End: 2024-10-31

## 2024-10-31 RX ORDER — FAMOTIDINE 20 MG/1
10 TABLET, FILM COATED ORAL DAILY
Status: DISCONTINUED | OUTPATIENT
Start: 2024-10-31 | End: 2024-10-31

## 2024-10-31 RX ORDER — MORPHINE SULFATE 2 MG/ML
2 INJECTION, SOLUTION INTRAMUSCULAR; INTRAVENOUS EVERY 4 HOURS PRN
Status: DISCONTINUED | OUTPATIENT
Start: 2024-10-31 | End: 2024-11-02 | Stop reason: HOSPADM

## 2024-10-31 RX ORDER — SODIUM CHLORIDE 0.9 % (FLUSH) 0.9 %
5-40 SYRINGE (ML) INJECTION EVERY 12 HOURS SCHEDULED
Status: DISCONTINUED | OUTPATIENT
Start: 2024-10-31 | End: 2024-11-02 | Stop reason: HOSPADM

## 2024-10-31 RX ORDER — POLYETHYLENE GLYCOL 3350 17 G/17G
17 POWDER, FOR SOLUTION ORAL DAILY PRN
Status: DISCONTINUED | OUTPATIENT
Start: 2024-10-31 | End: 2024-11-02 | Stop reason: HOSPADM

## 2024-10-31 RX ORDER — SODIUM CHLORIDE 0.9 % (FLUSH) 0.9 %
5-40 SYRINGE (ML) INJECTION PRN
Status: DISCONTINUED | OUTPATIENT
Start: 2024-10-31 | End: 2024-11-02 | Stop reason: HOSPADM

## 2024-10-31 RX ORDER — AMITRIPTYLINE HYDROCHLORIDE 10 MG/1
10 TABLET ORAL NIGHTLY
Status: DISCONTINUED | OUTPATIENT
Start: 2024-10-31 | End: 2024-11-02 | Stop reason: HOSPADM

## 2024-10-31 RX ORDER — POTASSIUM CHLORIDE 750 MG/1
40 TABLET, EXTENDED RELEASE ORAL PRN
Status: DISCONTINUED | OUTPATIENT
Start: 2024-10-31 | End: 2024-11-02 | Stop reason: HOSPADM

## 2024-10-31 RX ORDER — MAGNESIUM SULFATE IN WATER 40 MG/ML
2000 INJECTION, SOLUTION INTRAVENOUS PRN
Status: DISCONTINUED | OUTPATIENT
Start: 2024-10-31 | End: 2024-11-02 | Stop reason: HOSPADM

## 2024-10-31 RX ORDER — ONDANSETRON 2 MG/ML
4 INJECTION INTRAMUSCULAR; INTRAVENOUS EVERY 6 HOURS PRN
Status: DISCONTINUED | OUTPATIENT
Start: 2024-10-31 | End: 2024-10-31

## 2024-10-31 RX ORDER — METOCLOPRAMIDE HYDROCHLORIDE 5 MG/ML
10 INJECTION INTRAMUSCULAR; INTRAVENOUS EVERY 6 HOURS PRN
Status: DISCONTINUED | OUTPATIENT
Start: 2024-10-31 | End: 2024-11-02

## 2024-10-31 RX ORDER — PROCHLORPERAZINE EDISYLATE 5 MG/ML
10 INJECTION INTRAMUSCULAR; INTRAVENOUS EVERY 6 HOURS PRN
Status: DISCONTINUED | OUTPATIENT
Start: 2024-10-31 | End: 2024-11-02 | Stop reason: HOSPADM

## 2024-10-31 RX ORDER — HYDROMORPHONE HYDROCHLORIDE 1 MG/ML
1 INJECTION, SOLUTION INTRAMUSCULAR; INTRAVENOUS; SUBCUTANEOUS ONCE
Status: COMPLETED | OUTPATIENT
Start: 2024-10-31 | End: 2024-10-31

## 2024-10-31 RX ORDER — BUPROPION HYDROCHLORIDE 150 MG/1
300 TABLET ORAL DAILY
Status: DISCONTINUED | OUTPATIENT
Start: 2024-10-31 | End: 2024-10-31

## 2024-10-31 RX ORDER — ONDANSETRON 4 MG/1
8 TABLET, ORALLY DISINTEGRATING ORAL EVERY 8 HOURS PRN
Status: DISCONTINUED | OUTPATIENT
Start: 2024-10-31 | End: 2024-11-02 | Stop reason: HOSPADM

## 2024-10-31 RX ORDER — ONDANSETRON 2 MG/ML
4 INJECTION INTRAMUSCULAR; INTRAVENOUS EVERY 6 HOURS PRN
Status: DISCONTINUED | OUTPATIENT
Start: 2024-10-31 | End: 2024-11-02 | Stop reason: HOSPADM

## 2024-10-31 RX ORDER — SODIUM CHLORIDE 9 MG/ML
INJECTION, SOLUTION INTRAVENOUS CONTINUOUS
Status: DISPENSED | OUTPATIENT
Start: 2024-10-31 | End: 2024-11-01

## 2024-10-31 RX ORDER — CLONAZEPAM 1 MG/1
0.5 TABLET ORAL DAILY PRN
Status: DISCONTINUED | OUTPATIENT
Start: 2024-10-31 | End: 2024-11-02 | Stop reason: HOSPADM

## 2024-10-31 RX ORDER — ACETAMINOPHEN 650 MG/1
650 SUPPOSITORY RECTAL EVERY 6 HOURS PRN
Status: DISCONTINUED | OUTPATIENT
Start: 2024-10-31 | End: 2024-11-02 | Stop reason: HOSPADM

## 2024-10-31 RX ORDER — IOPAMIDOL 755 MG/ML
100 INJECTION, SOLUTION INTRAVASCULAR
Status: COMPLETED | OUTPATIENT
Start: 2024-10-31 | End: 2024-10-31

## 2024-10-31 RX ORDER — POTASSIUM CHLORIDE 7.45 MG/ML
10 INJECTION INTRAVENOUS PRN
Status: DISCONTINUED | OUTPATIENT
Start: 2024-10-31 | End: 2024-11-02 | Stop reason: HOSPADM

## 2024-10-31 RX ORDER — ACETAMINOPHEN 325 MG/1
650 TABLET ORAL EVERY 6 HOURS PRN
Status: DISCONTINUED | OUTPATIENT
Start: 2024-10-31 | End: 2024-11-02 | Stop reason: HOSPADM

## 2024-10-31 RX ORDER — ONDANSETRON 2 MG/ML
INJECTION INTRAMUSCULAR; INTRAVENOUS
Status: COMPLETED
Start: 2024-10-31 | End: 2024-10-31

## 2024-10-31 RX ORDER — SODIUM CHLORIDE 9 MG/ML
INJECTION, SOLUTION INTRAVENOUS PRN
Status: DISCONTINUED | OUTPATIENT
Start: 2024-10-31 | End: 2024-11-02 | Stop reason: HOSPADM

## 2024-10-31 RX ORDER — BUPROPION HYDROCHLORIDE 150 MG/1
300 TABLET ORAL EVERY 24 HOURS
Status: DISCONTINUED | OUTPATIENT
Start: 2024-10-31 | End: 2024-11-02 | Stop reason: HOSPADM

## 2024-10-31 RX ADMIN — AMITRIPTYLINE HYDROCHLORIDE 10 MG: 10 TABLET, FILM COATED ORAL at 21:20

## 2024-10-31 RX ADMIN — PROCHLORPERAZINE EDISYLATE 10 MG: 5 INJECTION INTRAMUSCULAR; INTRAVENOUS at 13:52

## 2024-10-31 RX ADMIN — PIPERACILLIN AND TAZOBACTAM 4500 MG: 4; .5 INJECTION, POWDER, FOR SOLUTION INTRAVENOUS at 12:12

## 2024-10-31 RX ADMIN — HYDROMORPHONE HYDROCHLORIDE 1 MG: 1 INJECTION, SOLUTION INTRAMUSCULAR; INTRAVENOUS; SUBCUTANEOUS at 12:14

## 2024-10-31 RX ADMIN — SODIUM CHLORIDE, PRESERVATIVE FREE 5 ML: 5 INJECTION INTRAVENOUS at 21:20

## 2024-10-31 RX ADMIN — PANTOPRAZOLE SODIUM 40 MG: 40 INJECTION, POWDER, FOR SOLUTION INTRAVENOUS at 13:01

## 2024-10-31 RX ADMIN — ONDANSETRON 4 MG: 4 TABLET, ORALLY DISINTEGRATING ORAL at 13:01

## 2024-10-31 RX ADMIN — BUPROPION HYDROCHLORIDE 300 MG: 150 TABLET, EXTENDED RELEASE ORAL at 21:19

## 2024-10-31 RX ADMIN — PIPERACILLIN AND TAZOBACTAM 3375 MG: 3; .375 INJECTION, POWDER, LYOPHILIZED, FOR SOLUTION INTRAVENOUS at 17:31

## 2024-10-31 RX ADMIN — SODIUM CHLORIDE 125 ML/HR: 9 INJECTION, SOLUTION INTRAVENOUS at 12:38

## 2024-10-31 RX ADMIN — ONDANSETRON 4 MG: 2 INJECTION INTRAMUSCULAR; INTRAVENOUS at 12:34

## 2024-10-31 RX ADMIN — IOPAMIDOL 100 ML: 755 INJECTION, SOLUTION INTRAVENOUS at 11:07

## 2024-10-31 ASSESSMENT — PAIN DESCRIPTION - ORIENTATION: ORIENTATION: MID

## 2024-10-31 ASSESSMENT — PAIN SCALES - GENERAL
PAINLEVEL_OUTOF10: 7
PAINLEVEL_OUTOF10: 1
PAINLEVEL_OUTOF10: 5
PAINLEVEL_OUTOF10: 7

## 2024-10-31 ASSESSMENT — LIFESTYLE VARIABLES
HOW MANY STANDARD DRINKS CONTAINING ALCOHOL DO YOU HAVE ON A TYPICAL DAY: 1 OR 2
HOW OFTEN DO YOU HAVE A DRINK CONTAINING ALCOHOL: MONTHLY OR LESS

## 2024-10-31 ASSESSMENT — PAIN DESCRIPTION - LOCATION
LOCATION: ABDOMEN

## 2024-10-31 ASSESSMENT — PAIN DESCRIPTION - DESCRIPTORS: DESCRIPTORS: ACHING;DISCOMFORT

## 2024-10-31 ASSESSMENT — PAIN - FUNCTIONAL ASSESSMENT: PAIN_FUNCTIONAL_ASSESSMENT: ACTIVITIES ARE NOT PREVENTED

## 2024-10-31 NOTE — CONSULTS
[facility]  [address] Shaw  [phone]                    GASTROENTEROLOGY CONSULTATION NOTE              NAME:  Ricky Butler   :   1980   MRN:   [id number]      Consult Date:   10/31/2024 4:23 PM    Chief Complaint:    Abdominal pain, nausea, diarrhea, hematochezia     History of Present Illness:    Patient is a 44 y.o. hx of IBS-D presents to the hospital for diffuse abdominal pain, nausea, diarrhea, and hematochezia.    GI consulted for nausea, diarrhea and hematochezia    Pt reports that this morning at 2am, he woke up with sudden urgency to have a BM. He states that he has had about 6 episodes of loose/watery diarrhea along with nausea. He noticed some bright red blood in his stool, prompting him to go to the hospital. Reports diffuse abdominal pain throughout the day, improved after given pain meds. Had an episode of vomiting while in the hospital. He states that last night he ate a pinwheel wrap from [employer] for dinner. He also reports having sinusitis - recently finished a course of Levaquin last week. No hx of Crohn's or ulcerative colitis.    CT showed colitis from the distal transverse colon through the distal descending colon.     Colon 2016 by Dr. Felix for diarrhea showed internal hemorrhoids, but was otherwise normal.       PMH:  No past medical history on file.    PSH:  Past Surgical History:   Procedure Laterality Date    SINUS SURGERY PROC UNLISTED  2010       Allergies:  Allergies   Allergen Reactions    Diclofenac Diarrhea     GI upset.     Diclofenac-Misoprostol Other (See Comments)     GI upset     Indomethacin Other (See Comments)     Gi symptoms       Home Medications:  Prior to Admission Medications   Prescriptions Last Dose Informant Patient Reported? Taking?   Eluxadoline (VIBERZI) 100 MG TABS   Yes No   Sig: Take 100 mg by mouth 2 times daily (with meals).   amitriptyline (ELAVIL) 10 MG tablet   Yes No   Sig: Take 1 tablet by mouth nightly  descending colon.   -Monitor labs  -Obtain stool studies, rule out C diff  -Anti-emetics, per hospitalist  -IVF  -Hgb normal  -Conservative tx for now. If sxs have not improved over the weekend, can consider colonoscopy sometime next week    GI following    Thanks for allowing me to participate in the care of this patient.  Signed By: Becki Burnett PA-C     10/31/2024  4:23 PM

## 2024-10-31 NOTE — ED TRIAGE NOTES
Patient arrived ambulatory via POV with cc lower abdominal pain that woke him up overnight, nausea, several episodes of diarrhea with bright red blood.

## 2024-10-31 NOTE — PROGRESS NOTES
1606 since an email to the attending ( Marlee) regarding the patient and symptoms of nausea. He has had 2 rounds of Zofran and compazine nothing is working. Recommenced increasing the Zofran or even adding Reglan.

## 2024-10-31 NOTE — ED PROVIDER NOTES
[facility] EMERGENCY DEPT  EMERGENCY DEPARTMENT ENCOUNTER      Patient Name: Ricky Butler  MRN: [id number]  Birthdate 1980  Date of Evaluation: 10/31/2024  Physician: Luis F Linn MD    CHIEF COMPLAINT       Chief Complaint   Patient presents with    Abdominal Pain    Rectal Bleeding       HISTORY OF PRESENT ILLNESS   (Location/Symptom, Timing/Onset, Context/Setting, Quality, Duration, Modifying Factors, Severity)   Ricky Butler, 44 y.o., male     44-year-old male with a history of IBS-D presents with a chief complaint of diffuse abdominal pain, diarrhea and hematochezia.  Patient reports multiple loose bowel movements last night.  This morning he noticed some blood in his stool.  He went to his physician's office today and had a mostly bloody bowel movement.  He denies history of Crohn's or ulcerative colitis          Nursing Notes were reviewed.    REVIEW OF SYSTEMS    (Not required)   Review of Systems    Except as noted above the remainder of the review of systems was reviewed and negative.     PAST MEDICAL HISTORY   No past medical history on file.    SURGICAL HISTORY       Past Surgical History:   Procedure Laterality Date    SINUS SURGERY PROC UNLISTED  12/2010       CURRENT MEDICATIONS       Previous Medications    AMITRIPTYLINE (ELAVIL) 10 MG TABLET    Take 1 tablet by mouth nightly    BUPROPION (WELLBUTRIN XL) 300 MG EXTENDED RELEASE TABLET    Take 1 tablet by mouth daily    CEFDINIR (OMNICEF) 300 MG CAPSULE    Take 300 mg by mouth 2 times daily    CLONAZEPAM (KLONOPIN) 0.5 MG TABLET    Take 0.5 mg by mouth daily as needed.    ELUXADOLINE (VIBERZI) 100 MG TABS    Take 100 mg by mouth 2 times daily (with meals).    FAMOTIDINE (PEPCID) 10 MG TABLET    Take 10 mg by mouth daily    LEVOFLOXACIN (LEVAQUIN) 500 MG TABLET    Take 500 mg by mouth daily    LOPERAMIDE (IMODIUM A-D) 2 MG TABLET    Take 2 mg by mouth 4 times daily as needed    PREDNISONE 10 MG (21) TBPK    Take 10 mg by mouth See Admin

## 2024-10-31 NOTE — H&P
[facility]  [address] [place]  [address] [phone]    Hospital Medicine Admission History and Physical      NAME:  Ricky Butler   :   1980   MRN:  [id number]     PCP:  Dora Rajput MD     Date of service:  10/31/2024         Subjective:     CHIEF COMPLAINT: Abdominal pain, nausea, diarrhea, rectal bleeding    HISTORY OF PRESENT ILLNESS:     Mr. Butler is a 44 y.o.   male who is admitted with colitis.  Mr. Butler's past medical history of anxiety, IBS presented to ER complaining of abdominal pain, nausea, diarrhea, rectal bleeding which started yesterday.  The abdominal pain started in the epigastric area and later involved lower abdominal area.  It is sharp, severe intensity associated with nausea.  Patient denies fever.  This morning, patient noted to have diarrhea later became bloody.  Patient was seen by his PCP who told him to go to the emergency room.  Never had similar symptoms in the past.    No past medical history on file.     Past Surgical History:   Procedure Laterality Date    SINUS SURGERY PROC UNLISTED  2010       Social History     Tobacco Use    Smoking status: Never    Smokeless tobacco: Never   Substance Use Topics    Alcohol use: Yes        Family History   Problem Relation Age of Onset    Heart Attack Paternal Grandfather 40    Cancer Paternal Grandmother         unknown    Stroke Maternal Grandmother     Diabetes Maternal Grandmother     Hypertension Maternal Grandmother     Breast Cancer Maternal Grandmother         with mets to bone    Other Father         ?guillan barre        Allergies   Allergen Reactions    Diclofenac Diarrhea     GI upset.     Diclofenac-Misoprostol Other (See Comments)     GI upset     Indomethacin Other (See Comments)     Gi symptoms        Prior to Admission medications    Medication Sig Start Date End Date Taking? Authorizing Provider   amitriptyline (ELAVIL) 10 MG tablet Take 1  detectable hernias  Lymph:  No cervical or inguinal adenopathy  Musc:  No cyanosis or clubbing  Skin:  No rashes or ulcers, skin turgor is good  Neuro:  Cranial nerves are grossly intact, no focal motor weakness, follows commands appropriately  Psych:  Good insight, oriented to person, place and time, alert       Labs:    Recent Labs     10/31/24  1026   WBC 21.9*   HGB 15.3   HCT 46.6        Recent Labs     10/31/24  1026   *   K 4.5      CO2 28   BUN 16   ALT 34     No results found for: \"GLUCPOC\"  No results for input(s): \"PH\", \"PCO2\", \"PO2\", \"HCO3\", \"FIO2\" in the last 72 hours.  No results for input(s): \"INR\" in the last 72 hours.    Telemetry reviewed:          Assessment/Plan:    Colitis/diarrhea.  CT scan of the abdomen: Transverse and descending colitis, without active arterial extravasation.  Keep NPO.  Check stool for C. difficile and Enterobacter.  Start Zosyn.  Consult GI     2.   Rectal bleeding.  Most likely secondary to above.  Keep NPO.  Consult GI.    3.  Leukocytosis.  This most likely secondary to #1.  Check blood culture stool for Enterobacter and C. difficile.  Continue antibiotics as above.      4.  Anxiety.  Continue home Wellbutrin, amitriptyline and clonazepam as needed      5.  Hyperlipidemia.  Not on medication      6.  Renal insufficiency.  Likely secondary to profuse diarrhea.  Start normal saline IV fluid and monitor.      7.  Hyponatremia, mild.  This is most likely secondary to volume depletion due to diarrhea.  Continue normal saline.     8.  GERD (gastroesophageal reflux disease).  Continue PPI           Previous medical records reviewed     Risk of deterioration: high      Total time spent with patient care Total time: 75 minutes. I personally saw and examined the patient during this time period.  Greater than 50% of this time was spent in counseling and coordination of care. minutes    I personally reviewed chart, notes, data and current medications in the

## 2024-11-01 LAB
ANION GAP SERPL CALC-SCNC: 6 MMOL/L (ref 2–12)
BASOPHILS # BLD: 0 K/UL (ref 0–0.1)
BASOPHILS NFR BLD: 0 % (ref 0–1)
BUN SERPL-MCNC: 15 MG/DL (ref 6–20)
BUN/CREAT SERPL: 11 (ref 12–20)
C DIFF GDH STL QL: NEGATIVE
C DIFF TOX A+B STL QL IA: NEGATIVE
C DIFF TOXIN INTERPRETATION: NORMAL
CALCIUM SERPL-MCNC: 8.2 MG/DL (ref 8.5–10.1)
CHLORIDE SERPL-SCNC: 103 MMOL/L (ref 97–108)
CO2 SERPL-SCNC: 28 MMOL/L (ref 21–32)
CREAT SERPL-MCNC: 1.39 MG/DL (ref 0.7–1.3)
DIFFERENTIAL METHOD BLD: ABNORMAL
EOSINOPHIL # BLD: 0.1 K/UL (ref 0–0.4)
EOSINOPHIL NFR BLD: 1 % (ref 0–7)
ERYTHROCYTE [DISTWIDTH] IN BLOOD BY AUTOMATED COUNT: 13.2 % (ref 11.5–14.5)
GLUCOSE SERPL-MCNC: 113 MG/DL (ref 65–100)
HCT VFR BLD AUTO: 39 % (ref 36.6–50.3)
HGB BLD-MCNC: 12.6 G/DL (ref 12.1–17)
IMM GRANULOCYTES # BLD AUTO: 0.1 K/UL (ref 0–0.04)
IMM GRANULOCYTES NFR BLD AUTO: 1 % (ref 0–0.5)
LACTATE SERPL-SCNC: 0.8 MMOL/L (ref 0.4–2)
LYMPHOCYTES # BLD: 1.6 K/UL (ref 0.8–3.5)
LYMPHOCYTES NFR BLD: 9 % (ref 12–49)
MCH RBC QN AUTO: 26.9 PG (ref 26–34)
MCHC RBC AUTO-ENTMCNC: 32.3 G/DL (ref 30–36.5)
MCV RBC AUTO: 83.2 FL (ref 80–99)
MONOCYTES # BLD: 1.3 K/UL (ref 0–1)
MONOCYTES NFR BLD: 7 % (ref 5–13)
NEUTS SEG # BLD: 14.8 K/UL (ref 1.8–8)
NEUTS SEG NFR BLD: 82 % (ref 32–75)
NRBC # BLD: 0 K/UL (ref 0–0.01)
NRBC BLD-RTO: 0 PER 100 WBC
PLATELET # BLD AUTO: 240 K/UL (ref 150–400)
PMV BLD AUTO: 10.7 FL (ref 8.9–12.9)
POTASSIUM SERPL-SCNC: 3.9 MMOL/L (ref 3.5–5.1)
RBC # BLD AUTO: 4.69 M/UL (ref 4.1–5.7)
SODIUM SERPL-SCNC: 137 MMOL/L (ref 136–145)
WBC # BLD AUTO: 18 K/UL (ref 4.1–11.1)

## 2024-11-01 PROCEDURE — 2580000003 HC RX 258: Performed by: INTERNAL MEDICINE

## 2024-11-01 PROCEDURE — 1100000000 HC RM PRIVATE

## 2024-11-01 PROCEDURE — 36415 COLL VENOUS BLD VENIPUNCTURE: CPT

## 2024-11-01 PROCEDURE — 85025 COMPLETE CBC W/AUTO DIFF WBC: CPT

## 2024-11-01 PROCEDURE — 6370000000 HC RX 637 (ALT 250 FOR IP): Performed by: INTERNAL MEDICINE

## 2024-11-01 PROCEDURE — 6370000000 HC RX 637 (ALT 250 FOR IP): Performed by: NURSE PRACTITIONER

## 2024-11-01 PROCEDURE — 94761 N-INVAS EAR/PLS OXIMETRY MLT: CPT

## 2024-11-01 PROCEDURE — 80048 BASIC METABOLIC PNL TOTAL CA: CPT

## 2024-11-01 PROCEDURE — 83605 ASSAY OF LACTIC ACID: CPT

## 2024-11-01 PROCEDURE — 6360000002 HC RX W HCPCS: Performed by: INTERNAL MEDICINE

## 2024-11-01 RX ORDER — SIMETHICONE 80 MG
80 TABLET,CHEWABLE ORAL EVERY 6 HOURS PRN
Status: DISCONTINUED | OUTPATIENT
Start: 2024-11-01 | End: 2024-11-02 | Stop reason: HOSPADM

## 2024-11-01 RX ADMIN — SIMETHICONE 80 MG: 80 TABLET, CHEWABLE ORAL at 21:13

## 2024-11-01 RX ADMIN — PIPERACILLIN AND TAZOBACTAM 3375 MG: 3; .375 INJECTION, POWDER, LYOPHILIZED, FOR SOLUTION INTRAVENOUS at 01:24

## 2024-11-01 RX ADMIN — BUPROPION HYDROCHLORIDE 300 MG: 150 TABLET, EXTENDED RELEASE ORAL at 21:12

## 2024-11-01 RX ADMIN — PIPERACILLIN AND TAZOBACTAM 3375 MG: 3; .375 INJECTION, POWDER, LYOPHILIZED, FOR SOLUTION INTRAVENOUS at 18:20

## 2024-11-01 RX ADMIN — SODIUM CHLORIDE, PRESERVATIVE FREE 5 ML: 5 INJECTION INTRAVENOUS at 09:26

## 2024-11-01 RX ADMIN — AMITRIPTYLINE HYDROCHLORIDE 10 MG: 10 TABLET, FILM COATED ORAL at 21:12

## 2024-11-01 RX ADMIN — SODIUM CHLORIDE, PRESERVATIVE FREE 10 ML: 5 INJECTION INTRAVENOUS at 10:28

## 2024-11-01 RX ADMIN — PIPERACILLIN AND TAZOBACTAM 3375 MG: 3; .375 INJECTION, POWDER, LYOPHILIZED, FOR SOLUTION INTRAVENOUS at 09:26

## 2024-11-01 RX ADMIN — SODIUM CHLORIDE: 9 INJECTION, SOLUTION INTRAVENOUS at 01:24

## 2024-11-01 RX ADMIN — SODIUM CHLORIDE, PRESERVATIVE FREE 10 ML: 5 INJECTION INTRAVENOUS at 21:13

## 2024-11-01 RX ADMIN — PANTOPRAZOLE SODIUM 40 MG: 40 INJECTION, POWDER, FOR SOLUTION INTRAVENOUS at 09:20

## 2024-11-01 NOTE — PROGRESS NOTES
[facility]  [address] [place] [address] [phone]        Hospitalist Progress Note      NAME: Ricky Butler   :  1980  MRM:  [id number]    Date/Time of service: 2024  7:53 AM       Subjective:     Chief Complaint:  Patient was personally seen and examined by me during this time period.  Chart reviewed.  Patient reports multiple bright red stools this AM. Abdominal pain has improved.        Objective:       Vitals:       Last 24hrs VS reviewed since prior progress note. Most recent are:    Vitals:    24 0310   BP: 116/63   Pulse: 80   Resp:    Temp:    SpO2: 94%     SpO2 Readings from Last 6 Encounters:   24 94%          Intake/Output Summary (Last 24 hours) at 2024 0753  Last data filed at 10/31/2024 2120  Gross per 24 hour   Intake 740.77 ml   Output --   Net 740.77 ml        Exam:     Physical Exam:    Gen:  Well-developed, well-nourished, in no acute distress  HEENT:  Pink conjunctivae, hearing intact to voice, moist mucous membranes  Neck:  Supple  Resp:  No accessory muscle use, clear breath sounds without wheezes rales or rhonchi  Card:  No murmurs, normal S1, S2 without thrills, bruits or peripheral edema  Abd:  Soft, non-tender, non-distended  Skin:  No rashes or ulcers, skin turgor is good  Neuro:  Cranial nerves 3-12 are grossly intact, follows commands appropriately  Psych:  Good insight, oriented to person, place and time, alert      Medications Reviewed: (see below)    Lab Data Reviewed: (see below)    ______________________________________________________________________    Medications:     Current Facility-Administered Medications   Medication Dose Route Frequency    amitriptyline (ELAVIL) tablet 10 mg  10 mg Oral Nightly    clonazePAM (KLONOPIN) tablet 0.5 mg  0.5 mg Oral Daily PRN    Eluxadoline TABS 100 mg  (Patient Supplied)  100 mg Oral BID WC    sodium chloride flush 0.9 % injection 5-40 mL  5-40 mL IntraVENous 2  resolved hospital problems. *      Ricky Butler is an 44 y.o. male with Hx of GERD and IBS who is admitted for GI bleed.    Colitis/diarrhea.  CT scan of the abdomen: Transverse and descending colitis, without active arterial extravasation.  Does have recent antibiotic use  --Keep NPO.    --Check stool for C. difficile and Enterobacter.    --Zosyn.    --Consult GI.   --fecal calprotectin  --lactate neg which makes ischemic colitis unlikely  --repeat CBC now.  Trend H and H     Rectal bleeding.  Most likely secondary to above.  Keep NPO.  Consult GI.     Leukocytosis.  This most likely secondary to above  Check blood culture, stool for Enterobacter and C. difficile.  Continue antibiotics as above.      Anxiety.  Continue home Wellbutrin, amitriptyline and clonazepam as needed      Hyperlipidemia.  Not on medication      Renal insufficiency.  Likely secondary to profuse diarrhea.  Start normal saline IV fluid and monitor.      Hyponatremia, mild.  This is most likely secondary to volume depletion due to diarrhea.  Continue normal saline.     GERD (gastroesophageal reflux disease).  Continue PPI    IBS: continue Eluxadoline.     **Prior records, notes, labs, radiology, and medications reviewed in [facility] if needed**    Total time spent with patient care: 30 Minutes **I personally saw and examined the patient during this time period**                 Care Plan discussed with: Patient    Discussed:  Care Plan    Prophylaxis:  SCD's    Disposition:  Home w/Family           ___________________________________________________    Attending Physician: Hans May MD

## 2024-11-01 NOTE — PROGRESS NOTES
[facility]  [address]  [phone]           GI PROGRESS NOTE        NAME: Ricky Butler   :  1980   MRN:  [id number]       Follow Up 24    Pt states that he almost feels back to normal. Has had 6 bloody BMs. Denies nausea, vomiting, abd pain. Pt is hungry.         VITALS:   Last 24hrs VS reviewed since prior progress note. Most recent are:  Vitals:    24 1532   BP: 121/77   Pulse: 67   Resp: 17   Temp: 98.4 °F (36.9 °C)   SpO2: 95%       Intake/Output Summary (Last 24 hours) at 2024 1537  Last data filed at 10/31/2024 2120  Gross per 24 hour   Intake 740.77 ml   Output --   Net 740.77 ml       PHYSICAL EXAM:  General: Alert, in no acute distress    HEENT: Anicteric sclerae.  Lungs:            CTA Bilaterally.   Heart:  Regular  rhythm,    Abdomen: Soft, Non distended, Non tender.  (+)Bowel sounds, no HSM  Extremities: No c/c/e  Neurologic:  CN 2-12 gi, Alert and oriented X 3.  No acute neurological distress   Psych:   Good insight. Not anxious nor agitated.    Lab Data Reviewed:   Recent Labs     10/31/24  1026 24  0627   WBC 21.9* 18.0*   HGB 15.3 12.6   HCT 46.6 39.0    240     Recent Labs     10/31/24  1026 24  0627   * 137   K 4.5 3.9    103   CO2 28 28   BUN 16 15     Recent Labs     10/31/24  1026   GLOB 4.2*       ________________________________________________________________________  Patient Active Problem List   Diagnosis    Muscle pain    Well adult exam    Diarrhea    Anxiety    Hyperlipidemia    Subacute maxillary sinusitis    Non-seasonal allergic rhinitis due to pollen    Numerous moles    Colitis    Leukocytosis    Renal insufficiency    Hyponatremia    GERD (gastroesophageal reflux disease)    Rectal bleeding         Assessment and Plan:  Patient is a 44 y.o. hx of IBS-D presents to the hospital for diffuse abdominal pain, nausea, diarrhea, and hematochezia.     GI consulted for nausea, diarrhea and

## 2024-11-01 NOTE — PROGRESS NOTES
12Called in consult for patient GI, and colitis     1812 reached out to attending via perfect serve to ask about doing oral hydration in place of iv fluids. Patient is no longer NPO.    1816 Provider agreed hung antibiotic as primary with no secondary. Informed patient to drink plenty of fluids since the iv ones have been dc'd.

## 2024-11-02 VITALS
HEIGHT: 73 IN | DIASTOLIC BLOOD PRESSURE: 77 MMHG | RESPIRATION RATE: 16 BRPM | WEIGHT: 198 LBS | SYSTOLIC BLOOD PRESSURE: 119 MMHG | OXYGEN SATURATION: 97 % | BODY MASS INDEX: 26.24 KG/M2 | TEMPERATURE: 98.2 F | HEART RATE: 78 BPM

## 2024-11-02 LAB
ALBUMIN SERPL-MCNC: 3.5 G/DL (ref 3.5–5)
ALBUMIN/GLOB SERPL: 0.9 (ref 1.1–2.2)
ALP SERPL-CCNC: 76 U/L (ref 45–117)
ALT SERPL-CCNC: 27 U/L (ref 12–78)
ANION GAP SERPL CALC-SCNC: 3 MMOL/L (ref 2–12)
AST SERPL-CCNC: 15 U/L (ref 15–37)
BASOPHILS # BLD: 0 K/UL (ref 0–0.1)
BASOPHILS NFR BLD: 0 % (ref 0–1)
BILIRUB SERPL-MCNC: 0.4 MG/DL (ref 0.2–1)
BUN SERPL-MCNC: 13 MG/DL (ref 6–20)
BUN/CREAT SERPL: 10 (ref 12–20)
C COLI+JEJUNI TUF STL QL NAA+PROBE: NEGATIVE
CALCIUM SERPL-MCNC: 8.6 MG/DL (ref 8.5–10.1)
CHLORIDE SERPL-SCNC: 106 MMOL/L (ref 97–108)
CO2 SERPL-SCNC: 28 MMOL/L (ref 21–32)
CREAT SERPL-MCNC: 1.25 MG/DL (ref 0.7–1.3)
DIFFERENTIAL METHOD BLD: ABNORMAL
EC STX1+STX2 GENES STL QL NAA+PROBE: NEGATIVE
EOSINOPHIL # BLD: 0.2 K/UL (ref 0–0.4)
EOSINOPHIL NFR BLD: 1 % (ref 0–7)
ERYTHROCYTE [DISTWIDTH] IN BLOOD BY AUTOMATED COUNT: 13.3 % (ref 11.5–14.5)
ETEC ELTA+ESTB GENES STL QL NAA+PROBE: NEGATIVE
GLOBULIN SER CALC-MCNC: 3.9 G/DL (ref 2–4)
GLUCOSE SERPL-MCNC: 96 MG/DL (ref 65–100)
HCT VFR BLD AUTO: 37.9 % (ref 36.6–50.3)
HGB BLD-MCNC: 12.4 G/DL (ref 12.1–17)
IMM GRANULOCYTES # BLD AUTO: 0.1 K/UL (ref 0–0.04)
IMM GRANULOCYTES NFR BLD AUTO: 0 % (ref 0–0.5)
LYMPHOCYTES # BLD: 2.4 K/UL (ref 0.8–3.5)
LYMPHOCYTES NFR BLD: 13 % (ref 12–49)
MCH RBC QN AUTO: 27 PG (ref 26–34)
MCHC RBC AUTO-ENTMCNC: 32.7 G/DL (ref 30–36.5)
MCV RBC AUTO: 82.6 FL (ref 80–99)
MONOCYTES # BLD: 1.1 K/UL (ref 0–1)
MONOCYTES NFR BLD: 6 % (ref 5–13)
NEUTS SEG # BLD: 14.1 K/UL (ref 1.8–8)
NEUTS SEG NFR BLD: 80 % (ref 32–75)
NRBC # BLD: 0 K/UL (ref 0–0.01)
NRBC BLD-RTO: 0 PER 100 WBC
P SHIGELLOIDES DNA STL QL NAA+PROBE: NEGATIVE
PLATELET # BLD AUTO: 265 K/UL (ref 150–400)
PMV BLD AUTO: 10.9 FL (ref 8.9–12.9)
POTASSIUM SERPL-SCNC: 3.7 MMOL/L (ref 3.5–5.1)
PROT SERPL-MCNC: 7.4 G/DL (ref 6.4–8.2)
RBC # BLD AUTO: 4.59 M/UL (ref 4.1–5.7)
SALMONELLA SP SPAO STL QL NAA+PROBE: NEGATIVE
SHIGELLA SP+EIEC IPAH STL QL NAA+PROBE: NEGATIVE
SODIUM SERPL-SCNC: 137 MMOL/L (ref 136–145)
V CHOL+PARA+VUL DNA STL QL NAA+NON-PROBE: NEGATIVE
WBC # BLD AUTO: 18 K/UL (ref 4.1–11.1)
Y ENTEROCOL DNA STL QL NAA+NON-PROBE: NEGATIVE

## 2024-11-02 PROCEDURE — 6370000000 HC RX 637 (ALT 250 FOR IP): Performed by: FAMILY MEDICINE

## 2024-11-02 PROCEDURE — 2580000003 HC RX 258: Performed by: INTERNAL MEDICINE

## 2024-11-02 PROCEDURE — 6360000002 HC RX W HCPCS: Performed by: INTERNAL MEDICINE

## 2024-11-02 PROCEDURE — 85025 COMPLETE CBC W/AUTO DIFF WBC: CPT

## 2024-11-02 PROCEDURE — 94761 N-INVAS EAR/PLS OXIMETRY MLT: CPT

## 2024-11-02 PROCEDURE — 80053 COMPREHEN METABOLIC PANEL: CPT

## 2024-11-02 RX ORDER — LEVOFLOXACIN 750 MG/1
750 TABLET, FILM COATED ORAL EVERY EVENING
Qty: 3 TABLET | Refills: 0 | Status: SHIPPED | OUTPATIENT
Start: 2024-11-02 | End: 2024-11-05

## 2024-11-02 RX ORDER — METOCLOPRAMIDE 10 MG/1
10 TABLET ORAL EVERY 6 HOURS PRN
Status: DISCONTINUED | OUTPATIENT
Start: 2024-11-02 | End: 2024-11-02 | Stop reason: HOSPADM

## 2024-11-02 RX ORDER — PANTOPRAZOLE SODIUM 40 MG/1
40 TABLET, DELAYED RELEASE ORAL
Status: DISCONTINUED | OUTPATIENT
Start: 2024-11-02 | End: 2024-11-02 | Stop reason: HOSPADM

## 2024-11-02 RX ORDER — METRONIDAZOLE 500 MG/1
500 TABLET ORAL 3 TIMES DAILY
Qty: 9 TABLET | Refills: 0 | Status: SHIPPED | OUTPATIENT
Start: 2024-11-02 | End: 2024-11-05

## 2024-11-02 RX ADMIN — PIPERACILLIN AND TAZOBACTAM 3375 MG: 3; .375 INJECTION, POWDER, LYOPHILIZED, FOR SOLUTION INTRAVENOUS at 10:23

## 2024-11-02 RX ADMIN — PIPERACILLIN AND TAZOBACTAM 3375 MG: 3; .375 INJECTION, POWDER, LYOPHILIZED, FOR SOLUTION INTRAVENOUS at 02:17

## 2024-11-02 RX ADMIN — PANTOPRAZOLE SODIUM 40 MG: 40 TABLET, DELAYED RELEASE ORAL at 10:20

## 2024-11-02 RX ADMIN — SODIUM CHLORIDE, PRESERVATIVE FREE 10 ML: 5 INJECTION INTRAVENOUS at 10:26

## 2024-11-02 NOTE — DISCHARGE SUMMARY
Patient ID:  Ricky Butler  [id number]  44 y.o.  1980    Admit date: 10/31/2024    Discharge date and time: 11/2/2024    Admission Diagnoses: Colitis [K52.9]    Discharge Diagnoses:    Principal Problem:    Colitis  Active Problems:    Diarrhea    Anxiety    Hyperlipidemia    Leukocytosis    Renal insufficiency    Hyponatremia    GERD (gastroesophageal reflux disease)    Rectal bleeding  Resolved Problems:    * No resolved hospital problems. *       RECOMMENDATIONS FOR PCP:   Follow up to ensure resolving symptoms. Consider GI referral.     Admission HPI:  Mr. Butler is a 44 y.o.   male who is admitted with colitis.  Mr. Butler's past medical history of anxiety, IBS presented to ER complaining of abdominal pain, nausea, diarrhea, rectal bleeding which started yesterday.  The abdominal pain started in the epigastric area and later involved lower abdominal area.  It is sharp, severe intensity associated with nausea.  Patient denies fever.  This morning, patient noted to have diarrhea later became bloody.  Patient was seen by his PCP who told him to go to the emergency room.  Never had similar symptoms in the past.     Hospital Course by problem addressed:  Ricky Butler is an 44 y.o. male with Hx of GERD and IBS who is admitted for hematochezia.     Colitis/diarrhea with hematochezia  CT scan of the abdomen: Transverse and descending colitis, without active arterial extravasation. Lactate was neg. Does have recent antibiotic use, but c-diff neg and enteric panel neg.  Hemoglobins were all normal. GI was consulted and did not recommend any acute intervention.  The hematochezia did resolved while admitted and the patient was asymptomatic so the patient was discharged home with levaquin and flagyl to complete 5 day course.      Leukocytosis.  This most likely secondary to above  Blood culture, enteric stool panel and c-diff all neg. Treated with zosyn while inpatient transitioned to levaquin and flagyl

## 2024-11-02 NOTE — PLAN OF CARE
Problem: Discharge Planning  Goal: Discharge to home or other facility with appropriate resources  Outcome: Progressing     Problem: Pain  Goal: Verbalizes/displays adequate comfort level or baseline comfort level  Outcome: Progressing     Problem: Safety - Adult  Goal: Free from fall injury  Outcome: Progressing     
No

## 2024-11-02 NOTE — PROGRESS NOTES
Pharmacy Dosing Services: 11/2/24    The pharmacist has determined that this patient meets P & T approved criteria for conversion from IV to oral therapy for the following medication: Reglan & Pantoprazole      The pharmacist has written the following order for the patient: Reglan 10mg po q6h prn & Pantoprazole 40mg po daily  The pharmacist will continue to monitor the patient's status and advise the physician if conversion back to IV therapy is recommended.    Signed Joi Ricketts RPH Contact information: [phone]

## 2024-11-02 NOTE — DISCHARGE INSTRUCTIONS
HOSPITALIST DISCHARGE INSTRUCTIONS  NAME:  Ricky Butler   :  1980   MRN:  [id number]     Date/Time:  2024 11:51 AM    ADMIT DATE: 10/31/2024     DISCHARGE DATE: 2024     DISCHARGE DIAGNOSIS:  ***    DISCHARGE INSTRUCTIONS:  Thank you for allowing us to participate in your care. Your discharging Hospitalist is Hans May MD. You were admitted for evaluation and treatment of the above. ***    --Please give a copy of this document to your primary care provider.  Please follow up with them within one week.       MEDICATIONS:    It is important that you take the medication exactly as they are prescribed.   Keep your medication in the bottles provided by the pharmacist and keep a list of the medication names, dosages, and times to be taken in your wallet.   Do not take other medications without consulting your doctor.             If you experience any of the following symptoms then please call your primary care physician or return to the emergency room if you cannot get hold of your doctor:  Fever, chills, nausea, vomiting, diarrhea, change in mentation, falling, bleeding, shortness of breath    Follow Up:  Please call the below provider to arrange hospital follow up appointment      Dora Rajput MD  [address] [address] [phone]    Schedule an appointment as soon as possible for a visit          Information obtained by :  I understand that if any problems occur once I am at home I am to contact my physician.    I understand and acknowledge receipt of the instructions indicated above.                                                                                                                                           Physician's or R.N.'s Signature                                                                  Date/Time

## 2024-11-03 LAB
BACTERIA SPEC CULT: NORMAL
SERVICE CMNT-IMP: NORMAL

## 2024-11-06 LAB
BACTERIA SPEC CULT: NORMAL
SERVICE CMNT-IMP: NORMAL

## 2024-11-07 LAB — CALPROTECTIN STL-MCNT: >8000 UG/G (ref 0–120)
